# Patient Record
Sex: FEMALE | Race: WHITE
[De-identification: names, ages, dates, MRNs, and addresses within clinical notes are randomized per-mention and may not be internally consistent; named-entity substitution may affect disease eponyms.]

---

## 2017-06-29 ENCOUNTER — HOSPITAL ENCOUNTER (INPATIENT)
Dept: HOSPITAL 17 - PHED | Age: 82
LOS: 8 days | Discharge: NURSING, REHAB OR LONG-TERM CARE | DRG: 481 | End: 2017-07-07
Attending: FAMILY MEDICINE | Admitting: FAMILY MEDICINE
Payer: MEDICARE

## 2017-06-29 VITALS — HEIGHT: 65 IN | BODY MASS INDEX: 14.88 KG/M2 | WEIGHT: 89.29 LBS

## 2017-06-29 VITALS
SYSTOLIC BLOOD PRESSURE: 117 MMHG | HEART RATE: 91 BPM | DIASTOLIC BLOOD PRESSURE: 61 MMHG | RESPIRATION RATE: 18 BRPM | OXYGEN SATURATION: 98 %

## 2017-06-29 VITALS
OXYGEN SATURATION: 98 % | HEART RATE: 96 BPM | DIASTOLIC BLOOD PRESSURE: 77 MMHG | SYSTOLIC BLOOD PRESSURE: 138 MMHG | RESPIRATION RATE: 16 BRPM | TEMPERATURE: 97.6 F

## 2017-06-29 VITALS
HEART RATE: 97 BPM | RESPIRATION RATE: 18 BRPM | SYSTOLIC BLOOD PRESSURE: 133 MMHG | OXYGEN SATURATION: 100 % | DIASTOLIC BLOOD PRESSURE: 66 MMHG

## 2017-06-29 VITALS
SYSTOLIC BLOOD PRESSURE: 139 MMHG | HEART RATE: 88 BPM | DIASTOLIC BLOOD PRESSURE: 77 MMHG | TEMPERATURE: 98 F | RESPIRATION RATE: 18 BRPM | OXYGEN SATURATION: 96 %

## 2017-06-29 VITALS — HEART RATE: 81 BPM

## 2017-06-29 DIAGNOSIS — Z66: ICD-10-CM

## 2017-06-29 DIAGNOSIS — I25.10: ICD-10-CM

## 2017-06-29 DIAGNOSIS — F02.80: ICD-10-CM

## 2017-06-29 DIAGNOSIS — I47.1: ICD-10-CM

## 2017-06-29 DIAGNOSIS — I10: ICD-10-CM

## 2017-06-29 DIAGNOSIS — N28.9: ICD-10-CM

## 2017-06-29 DIAGNOSIS — M81.0: ICD-10-CM

## 2017-06-29 DIAGNOSIS — I49.3: ICD-10-CM

## 2017-06-29 DIAGNOSIS — S72.21XA: ICD-10-CM

## 2017-06-29 DIAGNOSIS — D50.0: ICD-10-CM
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
## 2017-06-29 DIAGNOSIS — N39.0: ICD-10-CM

## 2017-06-29 DIAGNOSIS — E86.0: ICD-10-CM
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
## 2017-06-29 DIAGNOSIS — E78.5: ICD-10-CM

## 2017-06-29 DIAGNOSIS — S72.141A: Primary | ICD-10-CM

## 2017-06-29 DIAGNOSIS — F41.9: ICD-10-CM

## 2017-06-29 DIAGNOSIS — G30.9: ICD-10-CM

## 2017-06-29 LAB
ALP SERPL-CCNC: 105 U/L (ref 45–117)
ALT SERPL-CCNC: 17 U/L (ref 10–53)
ANION GAP SERPL CALC-SCNC: 15 MEQ/L (ref 5–15)
APTT BLD: 23.8 SEC (ref 24.3–30.1)
AST SERPL-CCNC: 38 U/L (ref 15–37)
BACTERIA #/AREA URNS HPF: (no result) /HPF
BASOPHILS # BLD AUTO: 0 TH/MM3 (ref 0–0.2)
BASOPHILS NFR BLD: 0.1 % (ref 0–2)
BILIRUB SERPL-MCNC: 0.7 MG/DL (ref 0.2–1)
BUN SERPL-MCNC: 36 MG/DL (ref 7–18)
CHLORIDE SERPL-SCNC: 104 MEQ/L (ref 98–107)
CK MB SERPL-MCNC: 6.1 NG/ML (ref 0.5–3.6)
CK SERPL-CCNC: 1373 U/L (ref 26–192)
COLOR UR: YELLOW
COMMENT (UR): (no result)
CULTURE IF INDICATED: (no result)
EOSINOPHIL # BLD: 0 TH/MM3 (ref 0–0.4)
EOSINOPHIL NFR BLD: 0.1 % (ref 0–4)
ERYTHROCYTE [DISTWIDTH] IN BLOOD BY AUTOMATED COUNT: 13.6 % (ref 11.6–17.2)
GFR SERPLBLD BASED ON 1.73 SQ M-ARVRAT: 36 ML/MIN (ref 89–?)
GLUCOSE UR STRIP-MCNC: (no result) MG/DL
HCO3 BLD-SCNC: 23.3 MEQ/L (ref 21–32)
HCT VFR BLD CALC: 34 % (ref 35–46)
HEMO FLAGS: (no result)
HGB UR QL STRIP: (no result)
INR PPP: 1 RATIO
KETONES UR STRIP-MCNC: (no result) MG/DL
LEUKOCYTE ESTERASE UR QL STRIP: (no result) /HPF (ref 0–5)
LYMPHOCYTES # BLD AUTO: 0.7 TH/MM3 (ref 1–4.8)
LYMPHOCYTES NFR BLD AUTO: 4.3 % (ref 9–44)
MCH RBC QN AUTO: 28.7 PG (ref 27–34)
MCHC RBC AUTO-ENTMCNC: 32.8 % (ref 32–36)
MCV RBC AUTO: 87.5 FL (ref 80–100)
METHOD OF COLLECTION: (no result)
MONOCYTES NFR BLD: 7.6 % (ref 0–8)
NEUTROPHILS # BLD AUTO: 13.8 TH/MM3 (ref 1.8–7.7)
NEUTROPHILS NFR BLD AUTO: 87.9 % (ref 16–70)
NITRITE UR QL STRIP: (no result)
PLATELET # BLD: 296 TH/MM3 (ref 150–450)
POTASSIUM SERPL-SCNC: 4.3 MEQ/L (ref 3.5–5.1)
PROTHROMBIN TIME: 10.7 SEC (ref 9.8–11.6)
RBC # BLD AUTO: 3.89 MIL/MM3 (ref 4–5.3)
RBC #/AREA URNS HPF: (no result) /HPF (ref 0–3)
SODIUM SERPL-SCNC: 142 MEQ/L (ref 136–145)
SP GR UR STRIP: 1.02 (ref 1–1.03)
SQUAMOUS #/AREA URNS HPF: (no result) /HPF (ref 0–5)
WBC # BLD AUTO: 15.7 TH/MM3 (ref 4–11)

## 2017-06-29 PROCEDURE — C1713 ANCHOR/SCREW BN/BN,TIS/BN: HCPCS

## 2017-06-29 PROCEDURE — 36430 TRANSFUSION BLD/BLD COMPNT: CPT

## 2017-06-29 PROCEDURE — 87186 SC STD MICRODIL/AGAR DIL: CPT

## 2017-06-29 PROCEDURE — 86901 BLOOD TYPING SEROLOGIC RH(D): CPT

## 2017-06-29 PROCEDURE — 51702 INSERT TEMP BLADDER CATH: CPT

## 2017-06-29 PROCEDURE — 83735 ASSAY OF MAGNESIUM: CPT

## 2017-06-29 PROCEDURE — 93005 ELECTROCARDIOGRAM TRACING: CPT

## 2017-06-29 PROCEDURE — 87077 CULTURE AEROBIC IDENTIFY: CPT

## 2017-06-29 PROCEDURE — 81001 URINALYSIS AUTO W/SCOPE: CPT

## 2017-06-29 PROCEDURE — 86900 BLOOD TYPING SEROLOGIC ABO: CPT

## 2017-06-29 PROCEDURE — 82306 VITAMIN D 25 HYDROXY: CPT

## 2017-06-29 PROCEDURE — 87086 URINE CULTURE/COLONY COUNT: CPT

## 2017-06-29 PROCEDURE — 82550 ASSAY OF CK (CPK): CPT

## 2017-06-29 PROCEDURE — 85025 COMPLETE CBC W/AUTO DIFF WBC: CPT

## 2017-06-29 PROCEDURE — 80053 COMPREHEN METABOLIC PANEL: CPT

## 2017-06-29 PROCEDURE — 84132 ASSAY OF SERUM POTASSIUM: CPT

## 2017-06-29 PROCEDURE — 82948 REAGENT STRIP/BLOOD GLUCOSE: CPT

## 2017-06-29 PROCEDURE — P9016 RBC LEUKOCYTES REDUCED: HCPCS

## 2017-06-29 PROCEDURE — 72125 CT NECK SPINE W/O DYE: CPT

## 2017-06-29 PROCEDURE — 96374 THER/PROPH/DIAG INJ IV PUSH: CPT

## 2017-06-29 PROCEDURE — 86920 COMPATIBILITY TEST SPIN: CPT

## 2017-06-29 PROCEDURE — 73552 X-RAY EXAM OF FEMUR 2/>: CPT

## 2017-06-29 PROCEDURE — 85610 PROTHROMBIN TIME: CPT

## 2017-06-29 PROCEDURE — 73502 X-RAY EXAM HIP UNI 2-3 VIEWS: CPT

## 2017-06-29 PROCEDURE — 80048 BASIC METABOLIC PNL TOTAL CA: CPT

## 2017-06-29 PROCEDURE — 76000 FLUOROSCOPY <1 HR PHYS/QHP: CPT

## 2017-06-29 PROCEDURE — 71010: CPT

## 2017-06-29 PROCEDURE — 70450 CT HEAD/BRAIN W/O DYE: CPT

## 2017-06-29 PROCEDURE — 85730 THROMBOPLASTIN TIME PARTIAL: CPT

## 2017-06-29 PROCEDURE — 86850 RBC ANTIBODY SCREEN: CPT

## 2017-06-29 PROCEDURE — 82552 ASSAY OF CPK IN BLOOD: CPT

## 2017-06-29 RX ADMIN — PHENYTOIN SODIUM SCH MLS/HR: 50 INJECTION INTRAMUSCULAR; INTRAVENOUS at 18:04

## 2017-06-29 RX ADMIN — Medication SCH ML: at 20:27

## 2017-06-29 RX ADMIN — SODIUM CHLORIDE SCH MLS/HR: 900 INJECTION INTRAVENOUS at 18:05

## 2017-06-29 RX ADMIN — DOCUSATE SODIUM SCH MG: 100 CAPSULE, LIQUID FILLED ORAL at 20:27

## 2017-06-29 NOTE — RADRPT
EXAM DATE/TIME:  06/29/2017 15:23 

 

[facility] COMPARISON:     No previous studies available for comparison.

 

INDICATIONS :     Patient found on floor today for unknown period of time

                      

RADIATION DOSE:     56.15 CTDIvol (mGy) 

 

 

MEDICAL HISTORY :     Non-responsive.  

SURGICAL HISTORY :      Non-responsive. 

ENCOUNTER:      Initial

ACUITY:      1 day

PAIN SCALE:      Non-responsive

LOCATION:        cranial 

 

TECHNIQUE:     Multiple contiguous axial images were obtained of the head.  Using automated exposure 
control and adjustment of the mA and/or kV according to patient size, radiation dose was kept as low 
as reasonably achievable to obtain optimal diagnostic quality images.   DICOM format image data is av
ailable electronically for review and comparison.  

 

FINDINGS:     

CEREBRUM:     Severe diffuse cerebral atrophy is noted. Moderate periventricular and subcortical whit
e matter small vessel ischemic changes are noted bilaterally. No evidence of midline shift, mass lesi
on, hemorrhage or acute infarction.  No extra-axial fluid collections are seen.

POSTERIOR FOSSA:     The cerebellum and brainstem are intact.  The 4th ventricle is midline.  The cer
ebellopontine angle is unremarkable.

EXTRACRANIAL:     The visualized portion of the orbits is intact.

SKULL:     The calvaria is intact.  No evidence of skull fracture.

 

CONCLUSION:     

1. Severe diffuse cerebral atrophy. 

2. Moderate periventricular and subcortical white matter small vessels changes bilaterally. 

3. No acute infarct, acute hemorrhage, mass effect or extra-axial fluid collections. 

 

 Tomas Bang MD on June 29, 2017 at 15:47           

Board Certified Radiologist.

 This report was verified electronically.

## 2017-06-29 NOTE — RADRPT
EXAM DATE/TIME:  06/29/2017 15:23 

 

[facility] COMPARISON:     

CHEST SINGLE AP, August 24, 2014, 0:09.

 

                     

INDICATIONS :     

Patient wouldn't put weight on leg while being assisted to stand caregiver states. Best images possib
le due to patient condition and dementia.

                     

 

MEDICAL HISTORY :     

Dementia.   

 

SURGICAL HISTORY :     

None.   

 

ENCOUNTER:     

Initial                                        

 

ACUITY:     

1 day      

 

PAIN SCORE:     

Non-responsive.

 

LOCATION:     

Bilateral chest 

 

FINDINGS:     

The heart and mediastinal structures are normal.  The pulmonary vascular pattern is normal.  The lung
s are clear.  Degenerative changes and scoliosis of the thoracolumbar spine are noted. 

 

CONCLUSION:     

 

1.  No acute cardiopulmonary disease.  

2.  Degenerative changes and scoliosis of the thoracolumbar spine.  

 

 Tomas Bang MD on June 29, 2017 at 15:43                

Board Certified Radiologist.

 This report was verified electronically.

## 2017-06-29 NOTE — PD
HPI


Chief Complaint:  hip pain


Time Seen by Provider:  14:16


Travel History


International Travel<30 days:  No


Contact w/Intl Traveler<30days:  No





History of Present Illness


HPI


This is an 84-year-old female who presents to the emergency department with 

pain in her right leg.  Her  reports that last evening when he tried to 

get her to stand up to go to dinner she wouldn't stand on her right leg and he 

noticed that it was somewhat swollen.  He tried several times to get her up he 

was unable to.  He has a nurse that helps and take care of the patient and 

neither him nor the nurse thinks that she fell but they can't be sure.  

Normally the patient ambulates around the house independently.  He says he just 

had a palliative care nurse come to the house yesterday to evaluate the patient 

for hospice but she said she didn't meet criteria because she is so functional.

  Today the pain is been worsening and the patient continues not to walk so 

they brought her to the emergency department.





PFSH


Past Medical History


Alzheimer's Disease:  Yes


Anxiety:  Yes


Heart Rhythm Problems:  Yes (SVT)


Cardiovascular Problems:  Yes (MVR)


High Cholesterol:  Yes


Congestive Heart Failure:  No


Dementia:  Yes


Diminished Hearing:  No


Endocrine:  No


Gastrointestinal Disorders:  No


Genitourinary:  No


Hypertension:  Yes


Implanted Vascular Access Dvce:  No


Musculoskeletal:  No


Neurologic:  Yes


Reproductive:  No


Respiratory:  No


Immunizations Current:  Yes


PNEUMOCCOCAL Vaccine (Year):  1


Menopausal:  Yes





Past Surgical History


Appendectomy:  Yes


Other Surgery:  No





Social History


Alcohol Use:  No


Tobacco Use:  No


Substance Use:  No





Allergies-Medications


(Allergen,Severity, Reaction):  


Coded Allergies:  


     Epinephrine (Verified  Adverse Reaction, Severe, Palpitations , 17)


     Lidocaine (Verified  Adverse Reaction, Severe, Palpitations , 17)


Reported Meds & Prescriptions





Reported Meds & Active Scripts


Active


Reported


Quetiapine (Quetiapine Fumarate) 50 Mg Tab 50 Mg PO HS








Review of Systems


ROS Limitations:  Poor Historian





Physical Exam


Narrative


GENERAL: Frail elderly female, uncomfortable appearing


SKIN: Dry with skin tenting


HEAD: Atraumatic. Normocephalic. 


EYES: Pupils equal and round.  No injection or drainage. 


ENT:  Moist mucous membranes


NECK: Trachea midline. 


CARDIOVASCULAR: Regular rate and rhythm.  No murmur appreciated.  Both feet are 

warm with normal capillary refill.  We were able to Doppler DP.  Pulses in both 

feet


RESPIRATORY: Clear to auscultation. Breath sounds equal bilaterally. 


GASTROINTESTINAL: Abdomen soft, non-tender, nondistended. 


MUSCULOSKELETAL: Swelling of the right proximal lower extremity with soft 

compartments.


NEUROLOGICAL: Mumbling words. No obvious cranial nerve deficits.  Moving all 

extremities.





Data


Data


Last Documented VS





Vital Signs








  Date Time  Temp Pulse Resp B/P Pulse Ox O2 Delivery O2 Flow Rate FiO2


 


17 15:45  91 18 117/61 98 Room Air  


 


17 14:20 97.6       








Orders





 Complete Blood Count With Diff (17 14:19)


Comprehensive Metabolic Panel (17 14:19)


Ct Brain W/O Iv Contrast(Rout) (17 )


Ct Cerv Spine W/O Contrast (17 )


Creatine Kinase (Cpk) (17 14:19)


Urinalysis - C+S If Indicated (17 14:19)


Hip, Uni(Ap&Lat) W Ap Pelvis (17 )


Femur (Ap & Lat/2vws) (17 )


Ketamine Inj (Ketalar Inj) (17 14:45)


CKMB (17 14:35)


CKMB% (17 14:35)


Chest, Single Ap (17 )


Urinary Catheter Insert/Apply (17 15:55)


Notify Dr: Other (17 15:55)


Sodium Chlor 0.9% 1000 Ml Inj (Ns 1000 M (17 16:00)


Type And Screen (17 15:55)


Prothrombin Time / Inr (Pt) (17 15:55)


Act Partial Throm Time (Ptt) (17 15:55)


Electrocardiogram (17 )


Admit Order (Ed Use Only) (17 15:58)





Labs








 Laboratory Tests








Test 17





 14:35


 


White Blood Count 15.7 TH/MM3


 


Red Blood Count 3.89 MIL/MM3


 


Hemoglobin 11.2 GM/DL


 


Hematocrit 34.0 %


 


Mean Corpuscular Volume 87.5 FL


 


Mean Corpuscular Hemoglobin 28.7 PG


 


Mean Corpuscular Hemoglobin 32.8 %





Concent 


 


Red Cell Distribution Width 13.6 %


 


Platelet Count 296 TH/MM3


 


Mean Platelet Volume 10.4 FL


 


Neutrophils (%) (Auto) 87.9 %


 


Lymphocytes (%) (Auto) 4.3 %


 


Monocytes (%) (Auto) 7.6 %


 


Eosinophils (%) (Auto) 0.1 %


 


Basophils (%) (Auto) 0.1 %


 


Neutrophils # (Auto) 13.8 TH/MM3


 


Lymphocytes # (Auto) 0.7 TH/MM3


 


Monocytes # (Auto) 1.2 TH/MM3


 


Eosinophils # (Auto) 0.0 TH/MM3


 


Basophils # (Auto) 0.0 TH/MM3


 


CBC Comment DIFF FINAL 


 


Differential Comment  


 


Sodium Level 142 MEQ/L


 


Potassium Level 4.3 MEQ/L


 


Chloride Level 104 MEQ/L


 


Carbon Dioxide Level 23.3 MEQ/L


 


Anion Gap 15 MEQ/L


 


Blood Urea Nitrogen 36 MG/DL


 


Creatinine 1.40 MG/DL


 


Estimat Glomerular Filtration 36 ML/MIN





Rate 


 


Random Glucose 222 MG/DL


 


Calcium Level 9.0 MG/DL


 


Total Bilirubin 0.7 MG/DL


 


Aspartate Amino Transf 38 U/L





(AST/SGOT) 


 


Alanine Aminotransferase 17 U/L





(ALT/SGPT) 


 


Alkaline Phosphatase 105 U/L


 


Total Creatine Kinase 1373 U/L


 


Creatine Kinase MB 6.1 NG/ML


 


Creatine Kinase MB % 0.4 %


 


Total Protein 7.0 GM/DL


 


Albumin 3.2 GM/DL














MDM


Medical Decision Making


Medical Screen Exam Complete:  Yes


Emergency Medical Condition:  Yes


Interpretation(s)


Afebrile, mild tachycardia


Leukocytosis


Mild anemia


87% neutrophils


Renal insufficiency


CK is 1373





Last 24 hours Impressions








Head CT 17 0000 Signed





Impressions: 





 Service Date/Time:   15:23 - CONCLUSION:  1. Severe 





 diffuse cerebral atrophy.  2. Moderate periventricular and subcortical white 





 matter small vessels changes bilaterally.  3. No acute infarct, acute 





 hemorrhage, mass effect or extra-axial fluid collections.    Tomas Bang MD 


 


Femur X-Ray 17 0000 Signed





Impressions: 





 Service Date/Time:   15:04 - CONCLUSION:  Proximal 

right 





 femur fracture     Anatoliy Reyna MD 


 


Chest X-Ray 17 0000 Signed





Impressions: 





 Service Date/Time:   15:23 - CONCLUSION:   1.  No acute 





 cardiopulmonary disease.   2.  Degenerative changes and scoliosis of the 





 thoracolumbar spine.     Tomas Bang MD 








Differential Diagnosis


Femur fracture, hip dislocation, intracranial hemorrhage, cervical spine 

fracture, electrolyte abnormality, dehydration


Narrative Course


This is a very frail 84-year-old female with dementia who presents to the 

emergency department with a proximal femur fracture.  It's unclear what the 

mechanism of injury was an it's possible that she fell and no one witnessed.  

She was placed on a monitor and an IV was established.  Labs demonstrate an 

elevated CK and symmetric acute renal insufficiency consistent with 

dehydration.  She was given a liter of IV fluid.  She does have a leukocytosis 

which may be a stress response.  Urinalysis is pending and nurse was asked to 

call results to inpatient hospitalist.  I spoke to Dr. Tran's team regarding 

the patient's femur fracture and the patient should be nothing by mouth after 

midnight.





I did have a conversation with the patient's  who is her decision maker 

regarding goals of care.  He says the patient has a living will and she would 

not want to be resuscitated if she  naturally in the hospital.  He agreed 

to placing a DNR/DNI order in the chart.





Physician Communication


Physician Communication


Discussed with Dr. Tran's team and Dr. Santacruz's service.





Diagnosis





 Primary Impression:  


 Fracture, proximal femur


 Qualified Code:  S72.001A - Fracture, proximal femur, right, closed, initial 

encounter





Admitting Information


Admitting Physician Requests:  it








Lyndsey Walker MD 2017 14:26

## 2017-06-29 NOTE — RADRPT
EXAM DATE/TIME:  06/29/2017 15:04 

 

[facility] COMPARISON:     

No previous studies available for comparison.

 

                     

INDICATIONS :     

Patient wouldn't put weight on leg while being assisted to stand caregiver states.Best images possibl
e due to patient condition and dementia.

                     

 

MEDICAL HISTORY :            

Dementia.   

 

SURGICAL HISTORY :     

None.   

 

ENCOUNTER:     

Initial                                        

 

ACUITY:     

1 day      

 

PAIN SCORE:     

Non-responsive.

 

LOCATION:     

Right  Femur.

 

FINDINGS:     

Examination reveals a mildly displaced and angulated spiral fracture of the intertrochanteric and sub
trochanteric portion of the proximal right femur. The femoral head is intact and remains situated ove
r the acetabulum. The distal femur appears intact.

 

CONCLUSION:     

Proximal right femur fracture

 

 

 

 Anatoliy Reyna MD on June 29, 2017 at 15:49           

Board Certified Radiologist.

 This report was verified electronically.

## 2017-06-29 NOTE — RADRPT
EXAM DATE/TIME:  06/29/2017 15:23 

 

[facility] COMPARISON:     

No previous studies available for comparison.

 

 

INDICATIONS :     

Patient found on floor today.

                      

 

RADIATION DOSE:     

25.49 CTDIvol (mGy) 

 

 

 

MEDICAL HISTORY :      

Non-responsive.  

 

SURGICAL HISTORY :       

Non-responsive. 

 

ENCOUNTER:      

Initial

 

ACUITY:      

1 day

 

PAIN SCALE:      

Non-responsive

 

LOCATION:       

Cranial 

 

TECHNIQUE:     

Volumetric scanning of the cervical spine was performed. Multiplanar reconstructions in the sagittal,
 coronal and oblique axial planes were performed.   Using automated exposure control and adjustment o
f the mA and/or kV according to patient size, radiation dose was kept as low as reasonably achievable
 to obtain optimal diagnostic quality images.   DICOM format image data is available electronically f
or review and comparison.  

 

FINDINGS:     

 

There is no acute fracture or prevertebral soft tissue swelling.  Diffuse cervical spondylosis is not
ed at all levels.  Grade I retrolisthesis of C4 in relation to C3 and C5 is noted.  Grade I anterolis
thesis of C7 in relation to T1 is noted.  Moderate spinal stenosis and bilateral foraminal narrowing 
is noted at C4-5.  Moderate bilateral foraminal narrowing without spinal stenosis at C3-4, C5-6 and C
6-7.  The bony relationship and alignment between C1 and C2 is well maintained.  Scoliosis of the cer
vical spine is noted. 

 

CONCLUSION:

1.  No acute fracture or prevertebral soft tissue swelling.

2.  Moderate spinal stenosis and bilateral foraminal narrowing at C4-5. 

3.  Moderate bilateral foraminal narrowing but no spinal stenosis at C3-4, C5-6 and C6-7. 

4.  Grade I retrolisthesis of C4 in relation to C3 and C5. 

5.  Grade I anterolisthesis of C7 in relation to T1. 

6.  Diffuse cervical spondylosis and scoliosis of the cervical spine. 

 

 Tomas Bang MD on June 29, 2017 at 15:59                

Board Certified Radiologist.

 This report was verified electronically.

## 2017-06-29 NOTE — RADRPT
EXAM DATE/TIME:  06/29/2017 15:09 

 

[facility] COMPARISON:     

No previous studies available for comparison.

 

                     

INDICATIONS :     

Patient wouldn't put weight on leg while being assisted to stand caregiver states. Best images possib
le due to patient condition and dementia.

                     

 

MEDICAL HISTORY :            

Dementia.   

 

SURGICAL HISTORY :     

None.   

 

ENCOUNTER:     

Initial                                        

 

ACUITY:     

1 day      

 

PAIN SCORE:     

Non-responsive.

 

LOCATION:     

Left  Hip.

 

FINDINGS:     

AP view of the pelvis demonstrates a 4 fragment comminuted intertrochanteric fracture of the right hi
p. Femoral head remains well-seated within the acetabulum.

 

Targeted evaluation of the left hip is unremarkable without evidence of fracture or dislocation.

 

The bony pelvis is intact.

 

CONCLUSION:     

Comminuted 4 fragment intertrochanteric fracture of the right hip

 

Intact left hip and AP pelvis.

 

 

 

 Fletcher Medina MD on June 29, 2017 at 16:26           

Board Certified Radiologist.

 This report was verified electronically.

## 2017-06-30 VITALS — OXYGEN SATURATION: 94 %

## 2017-06-30 VITALS
SYSTOLIC BLOOD PRESSURE: 130 MMHG | TEMPERATURE: 98 F | OXYGEN SATURATION: 96 % | RESPIRATION RATE: 22 BRPM | DIASTOLIC BLOOD PRESSURE: 87 MMHG | HEART RATE: 185 BPM

## 2017-06-30 VITALS
SYSTOLIC BLOOD PRESSURE: 115 MMHG | DIASTOLIC BLOOD PRESSURE: 57 MMHG | OXYGEN SATURATION: 95 % | RESPIRATION RATE: 18 BRPM | HEART RATE: 72 BPM | TEMPERATURE: 96.7 F

## 2017-06-30 VITALS
OXYGEN SATURATION: 100 % | SYSTOLIC BLOOD PRESSURE: 128 MMHG | HEART RATE: 62 BPM | TEMPERATURE: 97.2 F | RESPIRATION RATE: 16 BRPM | DIASTOLIC BLOOD PRESSURE: 69 MMHG

## 2017-06-30 VITALS
TEMPERATURE: 97.6 F | HEART RATE: 82 BPM | SYSTOLIC BLOOD PRESSURE: 105 MMHG | DIASTOLIC BLOOD PRESSURE: 60 MMHG | RESPIRATION RATE: 18 BRPM | OXYGEN SATURATION: 93 %

## 2017-06-30 VITALS
DIASTOLIC BLOOD PRESSURE: 50 MMHG | SYSTOLIC BLOOD PRESSURE: 103 MMHG | RESPIRATION RATE: 16 BRPM | OXYGEN SATURATION: 95 % | HEART RATE: 80 BPM | TEMPERATURE: 97.3 F

## 2017-06-30 VITALS
SYSTOLIC BLOOD PRESSURE: 100 MMHG | HEART RATE: 78 BPM | TEMPERATURE: 96.9 F | RESPIRATION RATE: 16 BRPM | OXYGEN SATURATION: 94 % | DIASTOLIC BLOOD PRESSURE: 61 MMHG

## 2017-06-30 VITALS — HEART RATE: 90 BPM

## 2017-06-30 VITALS — HEART RATE: 87 BPM

## 2017-06-30 LAB
ANION GAP SERPL CALC-SCNC: 7 MEQ/L (ref 5–15)
BUN SERPL-MCNC: 23 MG/DL (ref 7–18)
CHLORIDE SERPL-SCNC: 110 MEQ/L (ref 98–107)
CK SERPL-CCNC: 539 U/L (ref 26–192)
GFR SERPLBLD BASED ON 1.73 SQ M-ARVRAT: 93 ML/MIN (ref 89–?)
HCO3 BLD-SCNC: 26.4 MEQ/L (ref 21–32)
POTASSIUM SERPL-SCNC: 3.9 MEQ/L (ref 3.5–5.1)
SODIUM SERPL-SCNC: 143 MEQ/L (ref 136–145)

## 2017-06-30 PROCEDURE — 0QS606Z REPOSITION RIGHT UPPER FEMUR WITH INTRAMEDULLARY INTERNAL FIXATION DEVICE, OPEN APPROACH: ICD-10-PCS | Performed by: ORTHOPAEDIC SURGERY

## 2017-06-30 RX ADMIN — FAMOTIDINE SCH MG: 10 INJECTION, SOLUTION INTRAVENOUS at 14:29

## 2017-06-30 RX ADMIN — MORPHINE SULFATE PRN MG: 2 INJECTION, SOLUTION INTRAMUSCULAR; INTRAVENOUS at 20:37

## 2017-06-30 RX ADMIN — Medication SCH ML: at 09:00

## 2017-06-30 RX ADMIN — SODIUM CHLORIDE SCH MLS/HR: 900 INJECTION INTRAVENOUS at 18:06

## 2017-06-30 RX ADMIN — DOCUSATE SODIUM SCH MG: 100 CAPSULE, LIQUID FILLED ORAL at 12:48

## 2017-06-30 RX ADMIN — DOCUSATE SODIUM SCH MG: 100 CAPSULE, LIQUID FILLED ORAL at 09:00

## 2017-06-30 RX ADMIN — CALCIUM CARBONATE-CHOLECALCIFEROL TAB 250 MG-125 UNIT SCH MG: 250-125 TAB at 18:06

## 2017-06-30 RX ADMIN — INSULIN ASPART SCH: 100 INJECTION, SOLUTION INTRAVENOUS; SUBCUTANEOUS at 16:00

## 2017-06-30 RX ADMIN — PHENYTOIN SODIUM SCH MLS/HR: 50 INJECTION INTRAMUSCULAR; INTRAVENOUS at 12:48

## 2017-06-30 RX ADMIN — CALCIUM CARBONATE-CHOLECALCIFEROL TAB 250 MG-125 UNIT SCH MG: 250-125 TAB at 12:55

## 2017-06-30 RX ADMIN — INSULIN ASPART SCH: 100 INJECTION, SOLUTION INTRAVENOUS; SUBCUTANEOUS at 20:40

## 2017-06-30 RX ADMIN — CALCIUM CARBONATE-CHOLECALCIFEROL TAB 250 MG-125 UNIT SCH MG: 250-125 TAB at 18:00

## 2017-06-30 RX ADMIN — ERGOCALCIFEROL ONE UNITS: 1.25 CAPSULE, LIQUID FILLED ORAL at 12:48

## 2017-06-30 RX ADMIN — HYDROCODONE BITARTRATE AND ACETAMINOPHEN PRN TAB: 5; 325 TABLET ORAL at 01:33

## 2017-06-30 RX ADMIN — FAMOTIDINE SCH MG: 10 INJECTION, SOLUTION INTRAVENOUS at 20:46

## 2017-06-30 RX ADMIN — SODIUM CHLORIDE, PRESERVATIVE FREE SCH ML: 5 INJECTION INTRAVENOUS at 20:38

## 2017-06-30 RX ADMIN — PHENYTOIN SODIUM SCH MLS/HR: 50 INJECTION INTRAMUSCULAR; INTRAVENOUS at 16:18

## 2017-06-30 RX ADMIN — CALCIUM CARBONATE-CHOLECALCIFEROL TAB 250 MG-125 UNIT SCH MG: 250-125 TAB at 12:48

## 2017-06-30 RX ADMIN — DOCUSATE SODIUM SCH MG: 100 CAPSULE, LIQUID FILLED ORAL at 20:38

## 2017-06-30 RX ADMIN — ERGOCALCIFEROL ONE UNITS: 1.25 CAPSULE, LIQUID FILLED ORAL at 10:00

## 2017-06-30 NOTE — HHI.PR
Subjective


Remarks


pt seen post op  vss afebrile   ARNP came by to see her earlier this am  but 

she was already in surgery





Objective





 Vital Signs








  Date Time  Temp Pulse Resp B/P Pulse Ox O2 Delivery O2 Flow Rate FiO2


 


6/30/17 09:53 97.6 91 13 164/63 100 Simple Mask 7 


 


6/30/17 04:18 96.7 72 18 115/57 95   


 


6/30/17 01:03  87      


 


6/30/17 00:42 97.6 82 18 105/60 93   


 


6/29/17 22:17  81      


 


6/29/17 21:10 98.0 88 18 139/77 96   


 


6/29/17 18:00  97 18 133/66 100 Room Air  


 


6/29/17 15:45  91 18 117/61 98 Room Air  


 


6/29/17 14:20 97.6 96 16 138/77 98   








 I/O








 6/29/17 6/29/17 6/29/17 6/30/17 6/30/17 6/30/17





 07:00 15:00 23:00 07:00 15:00 23:00


 


Intake Total   1220 ml 840 ml 1000 ml 


 


Output Total   250 ml 150 ml 150 ml 


 


Balance   970 ml 690 ml 850 ml 


 


      


 


Intake Oral   120 ml 0 ml  


 


IV Total   1100 ml 840 ml  


 


Other     1000 ml 


 


Output Urine Total   250 ml 150 ml 100 ml 


 


Estimated Blood Loss     50 ml 


 


# Bowel Movements   0 0  








Result Diagram:  


6/29/17 1435                                                                   

             6/29/17 1435





Procedures


orif hip


Objective Remarks


GENERAL: 


SKIN: Warm and dry.


HEAD: Atraumatic. Normocephalic. 


EYES: Pupils equal and round. No scleral icterus. No injection or drainage. 


ENT: No nasal bleeding or discharge.  Mucous membranes pink and moist.


NECK: Trachea midline. No JVD. 


CARDIOVASCULAR: Regular rate and rhythm.  


RESPIRATORY: No accessory muscle use. Clear to auscultation. Breath sounds 

equal bilaterally. 


GASTROINTESTINAL: Abdomen soft, non-tender, nondistended. Hepatic and splenic 

margins not palpable. 


MUSCULOSKELETAL: Extremities without clubbing, cyanosis, or edema. No obvious 

deformities. 


NEUROLOGICAL: asleep post op No obvious cranial nerve deficits.  Motor grossly 

within normal limits.   end stage alzheimers


PSYCHIATRIC: Appropriate mood and affect; insight and judgment normal.


Medications and IVs





 Inpatient Medications


Acetaminophen (Tylenol) 650 mg Q4H  PRN PO FEVER;  Start 6/29/17 at 16:15


Acetaminophen/ Hydrocodone Bitart (Norco  5-325 Mg) 1 tab Q4H  PRN PO PAIN 1-10 

Last administered on 6/30/17at 01:33;  Start 6/30/17 at 00:15


Calcium/Vitamin D (Oscal-D 250-125) 250 mg TID PO ;  Start 6/30/17 at 13:00


Cefazolin Sodium/ Sodium Chloride (Ancef Inj/NS Inj) 100 ml @  200 mls/hr Q8H 

IV ;  Start 6/30/17 at 09:45;  Stop 6/30/17 at 10:18;  Status DC


Ceftriaxone Sodium 1000 mg/ Sodium Chloride 100 ml @  200 mls/hr Q24H IV  Last 

administered on 6/29/17at 18:05;  Start 6/29/17 at 18:00


Chlorhexidine Gluconate (Chlorhexidine 2% Cloth) 3 pack ON CALL  PRN TOPICAL 

SEE LABEL COMMENTS;  Start 6/29/17 at 22:00;  Stop 7/2/17 at 21:59


Cholecalciferol (Vitamin D3) 5,000 units DAILY PO ;  Start 7/1/17 at 09:00


Docusate Sodium (Colace) 100 mg BID PO ;  Start 6/29/17 at 21:00


Enoxaparin Sodium 30 mg 30 mg Q24H SQ ;  Start 7/1/17 at 09:00


Ergocalciferol (Drisdol) 50,000 units ONCE  ONCE PO ;  Start 6/30/17 at 10:00;  

Stop 6/30/17 at 10:01;  Status DC


Insulin Human Regular (NovoLIN R INJ) See Protocol Table ... ON CALL  PRN SQ 

SEE PROTOCOL TABLE;  Start 6/29/17 at 22:00;  Stop 7/2/17 at 21:59


IV Flush (NS Flush) 2 ml BID IVF ;  Start 6/30/17 at 21:00


Ketamine HCl 5 mg 5 mg ONCE  ONCE IV PUSH  Last administered on 6/29/17at 14:43

;  Start 6/29/17 at 14:45;  Stop 6/29/17 at 14:46;  Status DC


Lactated Ringer's 1,000 ml @  30 mls/hr Q24H PRN IV SEE LABEL COMMENTS;  Start 6 /29/17 at 22:00;  Stop 7/2/17 at 21:59


Metoprolol Tartrate (Lopressor) 25 mg ON CALL  PRN PO SEE LABEL COMMENTS;  

Start 6/29/17 at 22:00;  Stop 7/2/17 at 21:59


Miscellaneous Information ALL NURSING DEPARTME... UNSCH  PRN .XX SEE LABEL 

COMMENTS;  Start 6/30/17 at 09:46;  Stop 7/1/17 at 09:45


Morphine Sulfate (Morphine Inj) 3 mg Q3H  PRN IV PUSH break thru pain;  Start 6/ 30/17 at 09:45


Naloxone HCl 0.4 mg 0.4 mg UNSCH  PRN IV RESPIRATORY RATE LESS THAN 10;  Start 6 /29/17 at 16:15


Ondansetron HCl (Zofran Inj) 4 mg Q6H  PRN IVP NAUSEA OR VOMITING;  Start 6/29/ 17 at 16:15


Pneumococcal Polyvalent Vaccine (Pneumovax-23 Inj) 25 mcg ONCE ONCE IM ;  Start 

6/30/17 at 10:00;  Stop 6/30/17 at 10:00;  Status DC


Povidone Iodine (Betadine 5% Antisepsis Kit) 1 applic ON CALL  PRN EACH NARE 

SEE LABEL COMMENTS;  Start 6/29/17 at 22:00;  Stop 7/2/17 at 21:59


Sodium Chloride (NS 1000 ml Inj) 1,000 ml @  83 mls/hr Q12H3M IV  Last 

administered on 6/29/17at 18:04;  Start 6/29/17 at 16:12


Sodium Chloride ( ml Inj) 500 ml @  30 mls/hr [id number] PRN IV SEE LABEL 

COMMENTS;  Start 6/29/17 at 22:00;  Stop 7/2/17 at 21:59


Sodium Chloride (NS Flush) 2 ml BID IV FLUSH ;  Start 6/29/17 at 21:00;  Stop 6/ 30/17 at 09:57;  Status DC





Assessment and Plan


Problem List:  


(1) Fracture, proximal femur


Status:  Acute


Assessment and Plan


post op   will probably require a sitter once awake


Discussed Condition With


nuraing


Discharge Planning


[facility] rehab





Problem Qualifiers





(1) Fracture, proximal femur:  


Qualified Code:  S72.001A - Fracture, proximal femur, right, closed, initial 

encounter





Tank Santacruz DO Jun 30, 2017 11:54

## 2017-06-30 NOTE — MB
cc:

VAZQUEZALBINO

****

 

 

DATE OF CONSULTATION:  6/30/2017

 

REASON FOR CONSULTATION

Comminuted right proximal femur fracture.

 

HISTORY OF PRESENT ILLNESS

Marilee is an 84-year-old female who was at home.  She had a fall.  Her 

tried to get her up several times.  He was unable to get her to stand.  She was

unable to stand or ambulate.  She does have dementia.  She normally does

ambulate relatively independently.  She had significant right hip pain.  She

presented to the emergency room where x-rays revealed a comminuted displaced

right proximal femur fracture.  She is currently on the orthopedic floor with

her  at bedside.  She is awake but confused.

 

PAST MEDICAL HISTORY

 

ILLNESSES

1. Cardiac arrhythmia.

2. Alzheimer's disease.

3. Anxiety.

 

SURGERIES

Appendectomy.

 

ALLERGIES

1. EPINEPHRINE.

2. LIDOCAINE.

 

MEDICATIONS

Please see EMR for complete list of inpatient medications.

 

SOCIAL HISTORY

The patient does not smoke, drink or use drugs.  She lives at home with her

.

 

FAMILY HISTORY

Noncontributory.

 

REVIEW OF SYSTEMS

The review of systems is limited secondary to dementia.  The patient and her

 deny headache, visual changes, neck pain, chest pain, shortness of

breath, abdominal pain, nausea, vomiting or recent weight loss.  She complains

of right hip pain.  Pain is worse with movement.

 

PHYSICAL EXAMINATION

GENERAL:  The patient is a thin 84-year-old female in no acute distress.  She

is awake but confused.

VITAL SIGNS:  Temperature 96.7, pulse 72, respirations 18, blood pressure

115/57.  O2 sat is 95% on room air.

HEAD:  The patient is normocephalic.  Pupils are equal.

NECK:  Soft, nontender.  Trachea is midline.

ABDOMEN:  Soft, nontender, nondistended.

EXTREMITIES:  Examination of bilateral upper extremities reveals no pain with

shoulder, elbow or wrist motion.  She has good capillary refill in all of her

fingers.  Radial pulses are palpable bilaterally.

Examination of left leg reveals no pain with hip, knee or ankle motion.  Skin

is intact.  Sensation is intact.

Examination of right leg reveals pain with any hip motion.  Her right leg is

shortened compared to her left.  She has no tenderness around her knee, tibia

or ankle.  Dorsalis pedis pulse is palpable.  She has good capillary refill in

her foot.

 

X-RAYS

X-rays of the right femur were reviewed.  X-rays reveal a comminuted

subtrochanteric and intertrochanteric right hip fracture.

 

IMPRESSION

1. Osteoporosis.

2. Dementia.

3. Displaced right proximal femur fractures.

 

PLAN

The treatment options were discussed with the patient and her .  At this

point I would recommend reduction and intramedullary nail fixation of the right

femur.  The risks of surgery include bleeding, infection, injuries to arteries,

nerves and blood vessels, nonunion, malunion, painful hardware, as well as

medical complications including blood clot, stroke, heart attack and death.

All questions were answered.  I will plan on surgery today.

 

A mid-level provider in my office, nurse practitioner or PA, may see this

patient on a follow-up basis and continue to implement the objective of this

plan including: Starting or adjusting medications, injections of muscle,

tendon, bursa or joints, cast application, orthotic or brace application,

physical therapy, further radiographic studies including x-ray, MRI, CT,

ultrasounds or bone scan, vascular studies, neurologic studies, or other

specialist consultations, and proceeding with surgical management as

appropriate.

 

 

 

                              _________________________________

                              MD FRED Andrade/ALEBRTO

D:  6/30/2017/9:42 AM

T:  6/30/2017/9:51 AM

Visit #:  [id number]

Job #:  [id number]

## 2017-06-30 NOTE — RADRPT
EXAM DATE/TIME:  06/30/2017 09:26 

 

[facility] COMPARISON:     

No previous studies available for comparison.

 

                     

INDICATIONS :     

Open reduction internal fixation right hip troch nail.

                     

 

MEDICAL HISTORY :     

None.          

 

SURGICAL HISTORY :     

None.   

 

ENCOUNTER:     

Initial                                        

 

ACUITY:     

1 day      

 

PAIN SCORE:     

Non-responsive.

 

LOCATION:     

Right Hip.

 

FINDINGS:     

Troch nail is seen bridging the fracture of the proximal femur.  Alignment is anatomic.  

 

CONCLUSION:     

Anatomic alignment. 

 

 Bob Lucia MD FACR on June 30, 2017 at 15:19                

Board Certified Radiologist.

 This report was verified electronically.

## 2017-06-30 NOTE — PD.ORT.PN
Subjective


Subjective Remarks


Fall at home. Unable to ambulate. X-rays show right proximal femur fracture





Objective


Vitals





 Vital Signs








  Date Time  Temp Pulse Resp B/P Pulse Ox O2 Delivery O2 Flow Rate FiO2


 


6/30/17 04:18 96.7 72 18 115/57 95   


 


6/30/17 01:03  87      


 


6/30/17 00:42 97.6 82 18 105/60 93   


 


6/29/17 22:17  81      


 


6/29/17 21:10 98.0 88 18 139/77 96   


 


6/29/17 18:00  97 18 133/66 100 Room Air  


 


6/29/17 15:45  91 18 117/61 98 Room Air  


 


6/29/17 14:20 97.6 96 16 138/77 98   








 I/O








 6/29/17 6/29/17 6/29/17 6/30/17 6/30/17 6/30/17





 07:00 15:00 23:00 07:00 15:00 23:00


 


Intake Total   1220 ml 840 ml  


 


Output Total   250 ml   


 


Balance   970 ml 840 ml  


 


      


 


Intake Oral   120 ml   


 


IV Total   1100 ml 840 ml  


 


Output Urine Total   250 ml   


 


# Bowel Movements   0   








Result Diagram:  


6/29/17 1435                                                                   

             6/29/17 1435





Other Results





Laboratory Tests








Test 6/29/17





 15:55


 


Prothrombin Time 10.7 SEC





 (9.8-11.6)


 


Prothromb Time International 1.0 RATIO





Ratio 








Imaging





Last 72 hours Impressions








Hip and Pelvis X-Ray 6/29/17 0000 Signed





Impressions: 





 Service Date/Time:  Thursday, June 29, 2017 15:09 - CONCLUSION:  Comminuted 4 





 fragment intertrochanteric fracture of the right hip  Intact left hip and AP 





 pelvis.     Fletcher Medina MD 


 


Head CT 6/29/17 0000 Signed





Impressions: 





 Service Date/Time:  Thursday, June 29, 2017 15:23 - CONCLUSION:  1. Severe 





 diffuse cerebral atrophy.  2. Moderate periventricular and subcortical white 





 matter small vessels changes bilaterally.  3. No acute infarct, acute 





 hemorrhage, mass effect or extra-axial fluid collections.    Tomas Bang MD 


 


Femur X-Ray 6/29/17 0000 Signed





Impressions: 





 Service Date/Time:  Thursday, June 29, 2017 15:04 - CONCLUSION:  Proximal 

right 





 femur fracture     Anatoliy Reyna MD 


 


Chest X-Ray 6/29/17 0000 Signed





Impressions: 





 Service Date/Time:  Thursday, June 29, 2017 15:23 - CONCLUSION:   1.  No acute 





 cardiopulmonary disease.   2.  Degenerative changes and scoliosis of the 





 thoracolumbar spine.     Tomas Bang MD 


 


Cervical Spine CT 6/29/17 0000 Signed





Impressions: 





 Service Date/Time:  Thursday, June 29, 2017 15:23 - CONCLUSION:  1.  No acute 





 fracture or prevertebral soft tissue swelling. 2.  Moderate spinal stenosis 

and 





 bilateral foraminal narrowing at C4-5.  3.  Moderate bilateral foraminal 





 narrowing but no spinal stenosis at C3-4, C5-6 and C6-7.  4.  Grade I 





 retrolisthesis of C4 in relation to C3 and C5.  5.  Grade I anterolisthesis of 





 C7 in relation to T1.  6.  Diffuse cervical spondylosis and scoliosis of the 





 cervical spine.    Tomas Bang MD 








Objective Remarks


Right lower extremity: Intact skin over hip. Shortening of the leg. Pain with 

palpation of proximal femur. No pain with knee or ankle range of motion. 

Distally intact sensation is able to move all toes.


Left lower extremity: Full range of motion neurovascularly intact


Bilateral upper extremities no pain with range of motion and neurovascularly 

intact





Assessment & Plan


Assessment and Plan


Right intertrochanteric femur fracture


Nothing by mouth


Sign consents


Surgery this morning for intramedullary nail fixation








Gerber Calloway Jr. Jun 30, 2017 07:00

## 2017-06-30 NOTE — PD.OP
cc:   Ovidio Tran MD


__________________________________________________





Operative Report


Date of Surgery:  Jun 30, 2017


Preoperative Diagnosis:  


Comminuted right proximal femur subtrochanteric and intertrochanteric fractures


Postoperative Diagnosis:  


Procedure:


Reduction and intramedullary nail fixation right femur intertrochanteric and 

subtrochanteric fractures


Anesthesia:


Gen.


Surgeon:


Ovidio Tran


Assistant(s):


MAYO James PA-C


 


The surgical procedure was assisted by my physician assistant. My P.A. presence 

was necessary throughout this case for the manipulation and positioning of the 

surgical extremity. My P.A. was assisting me throughout the duration of this 

procedure. The skill set of a physician assistant was medically necessary to 

complete this procedure. During the surgical case the surgical tech was working 

at the back table and the physician assistant was directly assisting me.


Operation and Findings:


Implants used: [12]mm x [340]mm 130 Synthes TFNA  troch nail





Plan of activity: 50% weightbearing right leg





Patient was seen and evaluated preoperatively.  The patient has significant hip 

pain from proximal femur fracture.  The risk and benefits of surgery were 

discussed in depth with the patient and her family to include bleeding, 

infection, nonunion, malunion,  need for hip replacement, painful hardware, as 

well as medical competitions including blood clots, stroke, heart attack, and 

death.  Informed consent was obtained.  Operative site was marked.  Patient was 

brought to the operating room and placed on fracture table.  IV sedation was 

administered by anesthesiologist.  Timeout procedure was performed.  Hip and 

leg were prepped with alcohol followed by DuraPrep and draped in the usual 

sterile fashion.  IV antibiotics were given prior to incision.





Procedure began with reduction of fracture.  Traction was applied.  The leg was 

manipulated to achieve reduction.  Excellent reduction was achieved.  

Fluoroscopy was used to confirm reduction.  A three inch incision was made 

proximal to the trochanter.  Subcutaneous tissue was dissected bluntly.  

Guidepin was placed at the tip of the trochanter and advanced into the femoral 

canal.  Fluoroscopy confirmed appropriate guidepin placement.  A opening reamer 

was placed over the guidepin.  A long ball tipped guide pin was now placed down 

the femoral canal into the center of the distal femur.  The nail length was now 

measured.  Fluoroscopy confirmed appropriate guidepin placement.  Flexible 

reamers were now passed over the guidepin to ream the intramedullary canal.





The Synthes TFNA nail was attached to the insertion handle.  Nail was now 

placed over the guidepin into the femoral canal.  Fluoroscopy confirmed 

appropriate nail placement.  A second incision was made over the lateral thigh.

  Cannulas were placed through the insertion handle down to the femur.  

Guidepin was now placed through the femoral nail into the center of the femoral 

head.  Fluoroscopy confirmed appropriate guidepin placement.  Screw length was 

measured.  Cannulated drill was placed over the guidepin.  Appropriate length 

lag screw was now placed.  Traction was released and compression was applied. 

The set screw was now tightened in dynamic mode.





Next, using perfect [place]ie technique two distal interlocking screws were 

placed.  Screw holes were predrilled and screw lengths were measured.  Final 

fluoroscopy revealed well aligned fracture with well-placed hardware.  Incision 

was closed with 3-0 Vicryl and staples.  Sterile dressings were applied.  

Patient was awakened and transferred to recovery room.








Ovidio Tran MD Jun 30, 2017 09:32

## 2017-06-30 NOTE — EKG
Date Performed: 06/29/2017       Time Performed: 16:17:47

 

PTAGE:      84 years

 

EKG:      Sinus rhythm 

 

 Nonspecific ST-T wave changes COMPARED TO PRIOR ELECTROCARDIOGRAM, Rate has increased. 

 

 PREVIOUS TRACING            : 08/23/2014 23.40

 

DOCTOR:   Soren Dunn  Interpretating Date/Time  06/30/2017 05:06:57

## 2017-07-01 VITALS
OXYGEN SATURATION: 94 % | TEMPERATURE: 97.8 F | HEART RATE: 96 BPM | DIASTOLIC BLOOD PRESSURE: 56 MMHG | SYSTOLIC BLOOD PRESSURE: 136 MMHG | RESPIRATION RATE: 20 BRPM

## 2017-07-01 VITALS
TEMPERATURE: 97.4 F | OXYGEN SATURATION: 94 % | RESPIRATION RATE: 15 BRPM | HEART RATE: 79 BPM | SYSTOLIC BLOOD PRESSURE: 101 MMHG | DIASTOLIC BLOOD PRESSURE: 59 MMHG

## 2017-07-01 VITALS
TEMPERATURE: 96.7 F | HEART RATE: 98 BPM | DIASTOLIC BLOOD PRESSURE: 67 MMHG | OXYGEN SATURATION: 93 % | RESPIRATION RATE: 20 BRPM | SYSTOLIC BLOOD PRESSURE: 144 MMHG

## 2017-07-01 VITALS
OXYGEN SATURATION: 95 % | RESPIRATION RATE: 16 BRPM | DIASTOLIC BLOOD PRESSURE: 59 MMHG | SYSTOLIC BLOOD PRESSURE: 110 MMHG | HEART RATE: 73 BPM | TEMPERATURE: 96.3 F

## 2017-07-01 VITALS — HEART RATE: 63 BPM

## 2017-07-01 VITALS
TEMPERATURE: 96.5 F | HEART RATE: 65 BPM | OXYGEN SATURATION: 96 % | SYSTOLIC BLOOD PRESSURE: 90 MMHG | RESPIRATION RATE: 16 BRPM | DIASTOLIC BLOOD PRESSURE: 50 MMHG

## 2017-07-01 VITALS
SYSTOLIC BLOOD PRESSURE: 130 MMHG | DIASTOLIC BLOOD PRESSURE: 87 MMHG | TEMPERATURE: 98 F | HEART RATE: 181 BPM | OXYGEN SATURATION: 100 % | RESPIRATION RATE: 22 BRPM

## 2017-07-01 VITALS
DIASTOLIC BLOOD PRESSURE: 75 MMHG | SYSTOLIC BLOOD PRESSURE: 99 MMHG | OXYGEN SATURATION: 96 % | HEART RATE: 84 BPM | RESPIRATION RATE: 16 BRPM | TEMPERATURE: 97.6 F

## 2017-07-01 VITALS — HEART RATE: 62 BPM

## 2017-07-01 VITALS
DIASTOLIC BLOOD PRESSURE: 78 MMHG | OXYGEN SATURATION: 100 % | SYSTOLIC BLOOD PRESSURE: 117 MMHG | HEART RATE: 178 BPM | RESPIRATION RATE: 18 BRPM

## 2017-07-01 VITALS
DIASTOLIC BLOOD PRESSURE: 57 MMHG | TEMPERATURE: 96.6 F | OXYGEN SATURATION: 100 % | HEART RATE: 77 BPM | RESPIRATION RATE: 20 BRPM | SYSTOLIC BLOOD PRESSURE: 113 MMHG

## 2017-07-01 VITALS
DIASTOLIC BLOOD PRESSURE: 54 MMHG | SYSTOLIC BLOOD PRESSURE: 100 MMHG | RESPIRATION RATE: 16 BRPM | HEART RATE: 63 BPM | TEMPERATURE: 97.3 F | OXYGEN SATURATION: 100 %

## 2017-07-01 VITALS
HEART RATE: 98 BPM | DIASTOLIC BLOOD PRESSURE: 67 MMHG | OXYGEN SATURATION: 93 % | TEMPERATURE: 96.7 F | RESPIRATION RATE: 20 BRPM | SYSTOLIC BLOOD PRESSURE: 144 MMHG

## 2017-07-01 VITALS
DIASTOLIC BLOOD PRESSURE: 78 MMHG | RESPIRATION RATE: 18 BRPM | HEART RATE: 178 BPM | OXYGEN SATURATION: 100 % | SYSTOLIC BLOOD PRESSURE: 117 MMHG

## 2017-07-01 VITALS — HEART RATE: 96 BPM

## 2017-07-01 VITALS — HEART RATE: 76 BPM

## 2017-07-01 VITALS — HEART RATE: 68 BPM

## 2017-07-01 VITALS
SYSTOLIC BLOOD PRESSURE: 117 MMHG | DIASTOLIC BLOOD PRESSURE: 65 MMHG | HEART RATE: 77 BPM | RESPIRATION RATE: 16 BRPM | TEMPERATURE: 98.8 F | OXYGEN SATURATION: 99 %

## 2017-07-01 VITALS — HEART RATE: 181 BPM

## 2017-07-01 VITALS — HEART RATE: 175 BPM

## 2017-07-01 VITALS — HEART RATE: 70 BPM

## 2017-07-01 LAB
ALP SERPL-CCNC: 66 U/L (ref 45–117)
ALT SERPL-CCNC: 14 U/L (ref 10–53)
ANION GAP SERPL CALC-SCNC: 7 MEQ/L (ref 5–15)
AST SERPL-CCNC: 27 U/L (ref 15–37)
BASOPHILS # BLD AUTO: 0 TH/MM3 (ref 0–0.2)
BASOPHILS # BLD AUTO: 0 TH/MM3 (ref 0–0.2)
BASOPHILS NFR BLD: 0 % (ref 0–2)
BASOPHILS NFR BLD: 0.4 % (ref 0–2)
BILIRUB SERPL-MCNC: 0.4 MG/DL (ref 0.2–1)
BUN SERPL-MCNC: 25 MG/DL (ref 7–18)
CHLORIDE SERPL-SCNC: 111 MEQ/L (ref 98–107)
CK MB SERPL-MCNC: 1.6 NG/ML (ref 0.5–3.6)
CK MB SERPL-MCNC: 2 NG/ML (ref 0.5–3.6)
CK SERPL-CCNC: 460 U/L (ref 26–192)
EOSINOPHIL # BLD: 0 TH/MM3 (ref 0–0.4)
EOSINOPHIL # BLD: 0 TH/MM3 (ref 0–0.4)
EOSINOPHIL NFR BLD: 0 % (ref 0–4)
EOSINOPHIL NFR BLD: 0.2 % (ref 0–4)
ERYTHROCYTE [DISTWIDTH] IN BLOOD BY AUTOMATED COUNT: 13.7 % (ref 11.6–17.2)
ERYTHROCYTE [DISTWIDTH] IN BLOOD BY AUTOMATED COUNT: 13.7 % (ref 11.6–17.2)
GFR SERPLBLD BASED ON 1.73 SQ M-ARVRAT: 108 ML/MIN (ref 89–?)
HCO3 BLD-SCNC: 25.3 MEQ/L (ref 21–32)
HCT VFR BLD CALC: 18.2 % (ref 35–46)
HCT VFR BLD CALC: 29.8 % (ref 35–46)
HEMO FLAGS: (no result)
HEMO FLAGS: (no result)
LYMPHOCYTES # BLD AUTO: 0.8 TH/MM3 (ref 1–4.8)
LYMPHOCYTES # BLD AUTO: 1.4 TH/MM3 (ref 1–4.8)
LYMPHOCYTES NFR BLD AUTO: 14.9 % (ref 9–44)
LYMPHOCYTES NFR BLD AUTO: 9.1 % (ref 9–44)
MCH RBC QN AUTO: 29.1 PG (ref 27–34)
MCH RBC QN AUTO: 29.8 PG (ref 27–34)
MCHC RBC AUTO-ENTMCNC: 33.4 % (ref 32–36)
MCHC RBC AUTO-ENTMCNC: 34 % (ref 32–36)
MCV RBC AUTO: 87 FL (ref 80–100)
MCV RBC AUTO: 87.6 FL (ref 80–100)
MONOCYTES NFR BLD: 10.4 % (ref 0–8)
MONOCYTES NFR BLD: 8.8 % (ref 0–8)
NEUTROPHILS # BLD AUTO: 6.8 TH/MM3 (ref 1.8–7.7)
NEUTROPHILS # BLD AUTO: 7.2 TH/MM3 (ref 1.8–7.7)
NEUTROPHILS NFR BLD AUTO: 75.7 % (ref 16–70)
NEUTROPHILS NFR BLD AUTO: 80.5 % (ref 16–70)
PLATELET # BLD: 141 TH/MM3 (ref 150–450)
PLATELET # BLD: 144 TH/MM3 (ref 150–450)
POTASSIUM SERPL-SCNC: 3.9 MEQ/L (ref 3.5–5.1)
RBC # BLD AUTO: 2.09 MIL/MM3 (ref 4–5.3)
RBC # BLD AUTO: 3.4 MIL/MM3 (ref 4–5.3)
SODIUM SERPL-SCNC: 143 MEQ/L (ref 136–145)
WBC # BLD AUTO: 8.4 TH/MM3 (ref 4–11)
WBC # BLD AUTO: 9.5 TH/MM3 (ref 4–11)

## 2017-07-01 PROCEDURE — 30233N1 TRANSFUSION OF NONAUTOLOGOUS RED BLOOD CELLS INTO PERIPHERAL VEIN, PERCUTANEOUS APPROACH: ICD-10-PCS | Performed by: FAMILY MEDICINE

## 2017-07-01 RX ADMIN — FAMOTIDINE SCH MG: 10 INJECTION, SOLUTION INTRAVENOUS at 09:24

## 2017-07-01 RX ADMIN — CALCIUM CARBONATE-CHOLECALCIFEROL TAB 250 MG-125 UNIT SCH MG: 250-125 TAB at 13:00

## 2017-07-01 RX ADMIN — INSULIN ASPART SCH: 100 INJECTION, SOLUTION INTRAVENOUS; SUBCUTANEOUS at 16:00

## 2017-07-01 RX ADMIN — MORPHINE SULFATE PRN MG: 2 INJECTION, SOLUTION INTRAMUSCULAR; INTRAVENOUS at 11:57

## 2017-07-01 RX ADMIN — MORPHINE SULFATE PRN MG: 2 INJECTION, SOLUTION INTRAMUSCULAR; INTRAVENOUS at 04:58

## 2017-07-01 RX ADMIN — INSULIN ASPART SCH: 100 INJECTION, SOLUTION INTRAVENOUS; SUBCUTANEOUS at 20:23

## 2017-07-01 RX ADMIN — ENOXAPARIN SODIUM SCH MG: 30 INJECTION SUBCUTANEOUS at 09:24

## 2017-07-01 RX ADMIN — DOCUSATE SODIUM SCH MG: 100 CAPSULE, LIQUID FILLED ORAL at 20:19

## 2017-07-01 RX ADMIN — SODIUM CHLORIDE, PRESERVATIVE FREE SCH ML: 5 INJECTION INTRAVENOUS at 20:19

## 2017-07-01 RX ADMIN — CHOLECALCIFEROL CAP 125 MCG (5000 UNIT) SCH UNITS: 125 CAP at 09:00

## 2017-07-01 RX ADMIN — PHENYTOIN SODIUM SCH MLS/HR: 50 INJECTION INTRAMUSCULAR; INTRAVENOUS at 20:18

## 2017-07-01 RX ADMIN — FAMOTIDINE SCH MG: 10 INJECTION, SOLUTION INTRAVENOUS at 20:20

## 2017-07-01 RX ADMIN — MORPHINE SULFATE PRN MG: 2 INJECTION, SOLUTION INTRAMUSCULAR; INTRAVENOUS at 14:00

## 2017-07-01 RX ADMIN — SODIUM CHLORIDE, PRESERVATIVE FREE SCH ML: 5 INJECTION INTRAVENOUS at 09:00

## 2017-07-01 RX ADMIN — MORPHINE SULFATE PRN MG: 2 INJECTION, SOLUTION INTRAMUSCULAR; INTRAVENOUS at 22:39

## 2017-07-01 RX ADMIN — INSULIN ASPART SCH: 100 INJECTION, SOLUTION INTRAVENOUS; SUBCUTANEOUS at 05:02

## 2017-07-01 RX ADMIN — DOCUSATE SODIUM SCH MG: 100 CAPSULE, LIQUID FILLED ORAL at 09:00

## 2017-07-01 RX ADMIN — CALCIUM CARBONATE-CHOLECALCIFEROL TAB 250 MG-125 UNIT SCH MG: 250-125 TAB at 18:00

## 2017-07-01 RX ADMIN — SODIUM CHLORIDE SCH MLS/HR: 900 INJECTION INTRAVENOUS at 18:00

## 2017-07-01 RX ADMIN — PHENYTOIN SODIUM SCH MLS/HR: 50 INJECTION INTRAMUSCULAR; INTRAVENOUS at 04:21

## 2017-07-01 RX ADMIN — CALCIUM CARBONATE-CHOLECALCIFEROL TAB 250 MG-125 UNIT SCH MG: 250-125 TAB at 09:00

## 2017-07-01 NOTE — HHI.PR
Subjective


Remarks


called this am  hgb low 6.1  type cross and transfusion ordered  hgb was 11 

prior to  surgery  pt now resting quietly  vss afebrile





Objective





 Vital Signs








  Date Time  Temp Pulse Resp B/P Pulse Ox O2 Delivery O2 Flow Rate FiO2


 


7/1/17 06:10 97.4 79 15 101/59 94   


 


7/1/17 05:59 97.6 84 16 99/75 96   


 


7/1/17 04:00 96.3 73 16 110/59 95   


 


7/1/17 00:00 96.5 65 16 90/50 96   


 


6/30/17 20:56  90      


 


6/30/17 20:50 97.3 80 16 103/50 95   


 


6/30/17 17:55     94 Nasal Cannula 2.00 


 


6/30/17 16:00 96.9 78 16 100/61 94   


 


6/30/17 12:00 97.2 62 16 128/69 100   


 


6/30/17 11:30  70 15 131/60 98 Nasal Cannula 2 


 


6/30/17 11:15 97.5 72 15 131/59 98 Nasal Cannula 2 


 


6/30/17 11:00  71 15 138/63 96 Nasal Cannula 2 


 


6/30/17 10:45  73 15 140/65 99 Nasal Cannula 3 


 


6/30/17 10:30  74 14 143/67 98 Nasal Cannula 3 


 


6/30/17 10:15  78 14 145/62 97 Nasal Cannula 3 


 


6/30/17 10:00  80 13 141/68 96 Nasal Cannula 3 


 


6/30/17 09:53 97.6 91 13 164/63 100 Simple Mask 7 








 I/O








 6/30/17 6/30/17 6/30/17 7/1/17 7/1/17 7/1/17





 07:00 15:00 23:00 07:00 15:00 23:00


 


Intake Total 840 ml 1270 ml 240 ml 640 ml  


 


Output Total 150 ml 500 ml 300 ml 250 ml  


 


Balance 690 ml 770 ml -60 ml 390 ml  


 


      


 


Intake Oral 0 ml 120 ml 240 ml   


 


IV Total 840 ml 150 ml  640 ml  


 


Other  1000 ml    


 


Output Urine Total 150 ml 450 ml 300 ml 250 ml  


 


Estimated Blood Loss  50 ml    


 


# Bowel Movements 0  0 0  








Result Diagram:  


7/1/17 0127                                                                    

            6/30/17 2242





Imaging





Last 48 hours Impressions








Hip X-Ray 6/30/17 0000 Signed





Impressions: 





 Service Date/Time:  Friday, June 30, 2017 09:26 - CONCLUSION:  Anatomic 





 alignment.    Bob Lucia MD  FACR








Procedures


orif hip


Objective Remarks


GENERAL: somnolent


SKIN: Warm and dry.


HEAD: Atraumatic. Normocephalic. 


EYES: Pupils equal and round. No scleral icterus. No injection or drainage. 


ENT: No nasal bleeding or discharge.  Mucous membranes pink and moist.


NECK: Trachea midline. No JVD. 


CARDIOVASCULAR: Regular rate and rhythm.  


RESPIRATORY: No accessory muscle use. Clear to auscultation. Breath sounds 

equal bilaterally. 


GASTROINTESTINAL: Abdomen soft, non-tender, nondistended. Hepatic and splenic 

margins not palpable. 


MUSCULOSKELETAL: Extremities without clubbing, cyanosis, or edema. No obvious 

deformities. hip incision stable  


NEUROLOGICAL: asleep post op No obvious cranial nerve deficits.  Motor grossly 

within normal limits.   end stage alzheimers


PSYCHIATRIC:confused


Medications and IVs





 Inpatient Medications


Acetaminophen (Tylenol) 650 mg Q4H  PRN PO FEVER;  Start 6/29/17 at 16:15


Acetaminophen/ Hydrocodone Bitart (Norco  5-325 Mg) 1 tab Q4H  PRN PO PAIN 1-10 

Last administered on 6/30/17at 01:33;  Start 6/30/17 at 00:15


Calcium/Vitamin D (Oscal-D 250-125) 250 mg TID PO ;  Start 6/30/17 at 13:00


Cefazolin Sodium/ Sodium Chloride (Ancef Inj/NS Inj) 100 ml @  200 mls/hr Q8H 

IV ;  Start 6/30/17 at 09:45;  Stop 6/30/17 at 10:18;  Status DC


Ceftriaxone Sodium 1000 mg/ Sodium Chloride 100 ml @  200 mls/hr Q24H IV  Last 

administered on 6/30/17at 18:06;  Start 6/29/17 at 18:00


Chlorhexidine Gluconate (Chlorhexidine 2% Cloth) 3 pack ON CALL  PRN TOPICAL 

SEE LABEL COMMENTS;  Start 6/29/17 at 22:00;  Stop 7/2/17 at 21:59


Cholecalciferol (Vitamin D3) 5,000 units DAILY PO ;  Start 7/1/17 at 09:00


Dextrose (D50w (Vial) Inj) 50 ml UNSCH  PRN IV HYPOGLYCEMIA-SEE COMMENTS;  

Start 6/30/17 at 13:30


Docusate Sodium (Colace) 100 mg BID PO ;  Start 6/29/17 at 21:00


Enoxaparin Sodium 30 mg 30 mg Q24H SQ ;  Start 7/1/17 at 09:00


Ergocalciferol (Drisdol) 50,000 units ONCE  ONCE PO ;  Start 6/30/17 at 10:00;  

Stop 6/30/17 at 10:01;  Status DC


Famotidine (Pepcid Inj) 20 mg Q12HR IV PUSH  Last administered on 6/30/17at 20:

46;  Start 6/30/17 at 13:30


Glucagon (Glucagon Inj) 1 mg UNSCH  PRN OTHER HYPOGLYCEMIA-SEE COMMENTS;  Start 

6/30/17 at 13:30


Insulin Aspart (NovoLOG SUPPLEMENTAL SCALE) 1 ACHS SLIDING  SCALE SQ  Last 

administered on 6/30/17at 20:40;  Start 6/30/17 at 16:00


Insulin Human Regular (NovoLIN R INJ) See Protocol Table ... ON CALL  PRN SQ 

SEE PROTOCOL TABLE;  Start 6/29/17 at 22:00;  Stop 7/2/17 at 21:59


IV Flush (NS Flush) 2 ml BID IVF ;  Start 6/30/17 at 21:00


Ketamine HCl 5 mg 5 mg ONCE  ONCE IV PUSH  Last administered on 6/29/17at 14:43

;  Start 6/29/17 at 14:45;  Stop 6/29/17 at 14:46;  Status DC


Lactated Ringer's 1,000 ml @  30 mls/hr Q24H PRN IV SEE LABEL COMMENTS;  Start 6 /29/17 at 22:00;  Stop 7/2/17 at 21:59


Metoprolol Tartrate (Lopressor) 25 mg ON CALL  PRN PO SEE LABEL COMMENTS;  

Start 6/29/17 at 22:00;  Stop 7/2/17 at 21:59


Miscellaneous Information ALL NURSING DEPARTME... UNSCH  PRN .XX SEE LABEL 

COMMENTS;  Start 6/30/17 at 09:46;  Stop 7/1/17 at 09:45


Morphine Sulfate (Morphine Inj) 3 mg Q3H  PRN IV PUSH break thru pain Last 

administered on 7/1/17at 04:58;  Start 6/30/17 at 09:45


Naloxone HCl 0.4 mg 0.4 mg UNSCH  PRN IV RESPIRATORY RATE LESS THAN 10;  Start 6 /29/17 at 16:15


Ondansetron HCl (Zofran Inj) 4 mg Q6H  PRN IVP NAUSEA OR VOMITING;  Start 6/29/ 17 at 16:15


Pneumococcal Polyvalent Vaccine (Pneumovax-23 Inj) 25 mcg ONCE ONCE IM ;  Start 

6/30/17 at 10:00;  Stop 6/30/17 at 10:00;  Status DC


Povidone Iodine (Betadine 5% Antisepsis Kit) 1 applic ON CALL  PRN EACH NARE 

SEE LABEL COMMENTS;  Start 6/29/17 at 22:00;  Stop 7/2/17 at 21:59


Sodium Chloride (NS 1000 ml Inj) 1,000 ml @  83 mls/hr Q12H3M IV  Last 

administered on 6/30/17at 16:18;  Start 6/29/17 at 16:12


Sodium Chloride ( ml Inj) 500 ml @  30 mls/hr [id number] PRN IV SEE LABEL 

COMMENTS;  Start 6/29/17 at 22:00;  Stop 7/2/17 at 21:59


Sodium Chloride (NS Flush) 2 ml BID IV FLUSH ;  Start 6/29/17 at 21:00;  Stop 6/ 30/17 at 09:57;  Status DC





Assessment and Plan


Problem List:  


(1) Fracture, proximal femur


Status:  Acute


(2) Anemia


Status:  Acute


Plan:  type cross transfuse  2 units prbc





Assessment and Plan


post op   will probably require a sitter once awake





Problem Qualifiers





(1) Fracture, proximal femur:  


Qualified Code:  S72.001A - Fracture, proximal femur, right, closed, initial 

encounter





Tank Santacruz DO Jul 1, 2017 07:21

## 2017-07-01 NOTE — PD.ORT.PN
Subjective


Subjective Remarks


Patient has history of dementia.  and RN at bedside. Patient was 

transferred to the cardiac unit this morning due to tachycardia (HR 180s). Hgb 

6.1, receiving 2 units of PRBCs.





Objective


Vitals





 Vital Signs








  Date Time  Temp Pulse Resp B/P Pulse Ox O2 Delivery O2 Flow Rate FiO2


 


7/1/17 09:45 96.7 98 20 144/67 93   


 


7/1/17 09:44 96.7 98 20 144/67 93   


 


7/1/17 08:00 97.8 96 20 136/56 94   


 


7/1/17 06:10 97.4 79 15 101/59 94   


 


7/1/17 05:59 97.6 84 16 99/75 96   


 


7/1/17 04:00 96.3 73 16 110/59 95   


 


7/1/17 00:00 96.5 65 16 90/50 96   


 


6/30/17 20:56  90      


 


6/30/17 20:50 97.3 80 16 103/50 95   


 


6/30/17 17:55     94 Nasal Cannula 2.00 


 


6/30/17 16:00 96.9 78 16 100/61 94   








 I/O








 6/30/17 6/30/17 6/30/17 7/1/17 7/1/17 7/1/17





 06:59 14:59 22:59 06:59 14:59 22:59


 


Intake Total 840 ml 1150 ml 360 ml 640 ml  


 


Output Total 150 ml 300 ml 500 ml 250 ml  


 


Balance 690 ml 850 ml -140 ml 390 ml  


 


      


 


Intake Oral 0 ml 0 ml 360 ml   


 


IV Total 840 ml 150 ml  640 ml  


 


Other  1000 ml    


 


Output Urine Total 150 ml 250 ml 500 ml 250 ml  


 


Estimated Blood Loss  50 ml    


 


# Bowel Movements 0  0 0  








Result Diagram:  


7/1/17 0127                                                                    

            6/30/17 2242





Imaging





Last 72 hours Impressions








Hip and Pelvis X-Ray 6/29/17 0000 Signed





Impressions: 





 Service Date/Time:  Thursday, June 29, 2017 15:09 - CONCLUSION:  Comminuted 4 





 fragment intertrochanteric fracture of the right hip  Intact left hip and AP 





 pelvis.     Fletcher Medina MD 


 


Head CT 6/29/17 0000 Signed





Impressions: 





 Service Date/Time:  Thursday, June 29, 2017 15:23 - CONCLUSION:  1. Severe 





 diffuse cerebral atrophy.  2. Moderate periventricular and subcortical white 





 matter small vessels changes bilaterally.  3. No acute infarct, acute 





 hemorrhage, mass effect or extra-axial fluid collections.    Tomas Bang MD 


 


Femur X-Ray 6/29/17 0000 Signed





Impressions: 





 Service Date/Time:  Thursday, June 29, 2017 15:04 - CONCLUSION:  Proximal 

right 





 femur fracture     Anatoliy Reyna MD 


 


Chest X-Ray 6/29/17 0000 Signed





Impressions: 





 Service Date/Time:  Thursday, June 29, 2017 15:23 - CONCLUSION:   1.  No acute 





 cardiopulmonary disease.   2.  Degenerative changes and scoliosis of the 





 thoracolumbar spine.     Tomas Bang MD 


 


Cervical Spine CT 6/29/17 0000 Signed





Impressions: 





 Service Date/Time:  Thursday, June 29, 2017 15:23 - CONCLUSION:  1.  No acute 





 fracture or prevertebral soft tissue swelling. 2.  Moderate spinal stenosis 

and 





 bilateral foraminal narrowing at C4-5.  3.  Moderate bilateral foraminal 





 narrowing but no spinal stenosis at C3-4, C5-6 and C6-7.  4.  Grade I 





 retrolisthesis of C4 in relation to C3 and C5.  5.  Grade I anterolisthesis of 





 C7 in relation to T1.  6.  Diffuse cervical spondylosis and scoliosis of the 





 cervical spine.    Tomas Bang MD 








Objective Remarks


Right lower extremity:


dressing C/D/I


minimal swelling


calves soft





Assessment & Plan


Assessment and Plan


POD # 1 Reduction and intramedullary nail fixation right femur 

intertrochanteric and subtrochanteric fractures


Pain management - Norco


DVT prophylaxis - Lovenox


Physical therapy - 50% weight bearing


Initiate daily dressing changes on POD 2


D/C planning - anticipating SNF


Monitor








Elvin Bueno Jul 1, 2017 15:24

## 2017-07-01 NOTE — MB
cc:

PORFIRIO DE LEON OTAKAR

****

 

 

DATE OF CONSULTATION:

07/01/2017

 

IMPRESSIONS:

1. Supraventricular tachycardia.

2. Right hip fracture, no immediate postop open reduction internal fixation of

     A proximal femoral fracture.

3. Advanced age.

4. Advanced dementia.

5. Profound anemia, postoperative bleeding.

 

RECOMMENDATIONS

1. Transfuse to maintain hematocrit 25% or greater.

2. High converted the patient from PSVT to sinus rhythm with carotid sinus

     massage.

3. The patient's blood pressures been problematic / low, we will use digoxin as

     AD blocking drug at this point in time this may be changed later on.

 

CLINICAL DATA

Mrs. Moreau is a 84-year-old female who broke her right femur,  no one knows

how this happened.  There were no witnessed falls. Her  is her

caregiver, she Is unable to communicate with him. She is a retired nurse.  She

does not use alcohol.  Does not smoke.  She has no history of hypertension,

diabetes or dyslipidemia.

 

She apparently had two episodes of paroxysmal A fib / PSVT within the past

several years require emergency room evaluations.

 

MEDICATIONS:

Her medications at the time of admission included

___________ of 50 mg daily.

 

PAST MEDICAL HISTORY:

She has no history of myocardial infarction, congestive heart failure or

ventricular arrhythmias.  No history of seizure, stroke or TIA.  No history of

lung disease.  She has never smoked.  No history of GI bleeding.  No history of

liver or kidney disease.

 

 

 

 

PAST SURGICAL HISTORY:

Includes appendectomy.

 

PHYSICAL EXAMINATION

At this time demonstrates a elderly, frail appearing female laying in bed on

her side.  She has an irregular rhythm, rate of about 160-170.

HEAD, EYES, EARS, NOSE, AND THROAT: Exam demonstrates anicteric sclera.  There

are no bruits.  Jugular venous pressures are not elevated.  She is tachypenic,

her lungs are clear however.

CARDIOVASCULAR SYSTEM: Regular rate and rhythm, no gallops noted. 1/6 systolic

ejection murmur.

ABDOMEN: Abdominal exam the and soft, nontender.  Peripheral pulses are

palpable.  An electrocardiogram on admission demonstrated a normal sinus

rhythm, poor R-wave progression, normal axis and nonspecific ST-T changes.

Electrocardiogram apparently was done last night, not retrievable from the

medical record.

 

LABORATORY FINDINGS

Most recent hematocrit 18 with a hemoglobin of 6. platelet count 144.  Her most

recent lytes 143, 3.9 01/10 26 with a BUN of 23, creatinine 0.6 random blood

sugar 148, calcium 7.7.  Albumin was 3.2 on admission.

 

DISCUSSION

This is an elderly female with advanced dementia, unable to speak who is

developed SVT, postop repair of a fractured right femur.  We were able to

convert her with carotid sinus massage as her blood pressure has been low we

will use digoxin as an AV blocking drug.

 

 

 

 

                              _________________________________

                              DO ZEHRA Montez/sterling

D:  7/1/2017/1:43 PM

T:  7/1/2017/2:19 PM

Visit #:  [id number]

Job #:  [id number]

## 2017-07-02 VITALS — HEART RATE: 74 BPM

## 2017-07-02 VITALS
DIASTOLIC BLOOD PRESSURE: 59 MMHG | TEMPERATURE: 98.4 F | SYSTOLIC BLOOD PRESSURE: 149 MMHG | OXYGEN SATURATION: 97 % | RESPIRATION RATE: 16 BRPM | HEART RATE: 87 BPM

## 2017-07-02 VITALS — HEART RATE: 63 BPM

## 2017-07-02 VITALS — HEART RATE: 61 BPM

## 2017-07-02 VITALS
OXYGEN SATURATION: 95 % | HEART RATE: 84 BPM | SYSTOLIC BLOOD PRESSURE: 141 MMHG | TEMPERATURE: 98.6 F | RESPIRATION RATE: 20 BRPM | DIASTOLIC BLOOD PRESSURE: 67 MMHG

## 2017-07-02 VITALS
SYSTOLIC BLOOD PRESSURE: 145 MMHG | DIASTOLIC BLOOD PRESSURE: 77 MMHG | RESPIRATION RATE: 16 BRPM | TEMPERATURE: 97.8 F | HEART RATE: 79 BPM | OXYGEN SATURATION: 95 %

## 2017-07-02 VITALS
RESPIRATION RATE: 18 BRPM | TEMPERATURE: 98.7 F | DIASTOLIC BLOOD PRESSURE: 60 MMHG | HEART RATE: 61 BPM | SYSTOLIC BLOOD PRESSURE: 120 MMHG | OXYGEN SATURATION: 98 %

## 2017-07-02 VITALS
RESPIRATION RATE: 14 BRPM | TEMPERATURE: 98.8 F | SYSTOLIC BLOOD PRESSURE: 146 MMHG | DIASTOLIC BLOOD PRESSURE: 72 MMHG | HEART RATE: 73 BPM | OXYGEN SATURATION: 98 %

## 2017-07-02 VITALS
HEART RATE: 77 BPM | TEMPERATURE: 97 F | DIASTOLIC BLOOD PRESSURE: 56 MMHG | OXYGEN SATURATION: 94 % | RESPIRATION RATE: 17 BRPM | SYSTOLIC BLOOD PRESSURE: 128 MMHG

## 2017-07-02 VITALS — HEART RATE: 71 BPM

## 2017-07-02 VITALS
OXYGEN SATURATION: 95 % | TEMPERATURE: 98.5 F | HEART RATE: 78 BPM | SYSTOLIC BLOOD PRESSURE: 153 MMHG | RESPIRATION RATE: 16 BRPM | DIASTOLIC BLOOD PRESSURE: 74 MMHG

## 2017-07-02 VITALS — HEART RATE: 78 BPM

## 2017-07-02 VITALS — HEART RATE: 80 BPM

## 2017-07-02 VITALS — HEART RATE: 72 BPM

## 2017-07-02 LAB
ALP SERPL-CCNC: 87 U/L (ref 45–117)
ALT SERPL-CCNC: 15 U/L (ref 10–53)
ANION GAP SERPL CALC-SCNC: 3 MEQ/L (ref 5–15)
AST SERPL-CCNC: 23 U/L (ref 15–37)
BASOPHILS # BLD AUTO: 0 TH/MM3 (ref 0–0.2)
BASOPHILS # BLD AUTO: 0 TH/MM3 (ref 0–0.2)
BASOPHILS NFR BLD: 0.3 % (ref 0–2)
BASOPHILS NFR BLD: 0.4 % (ref 0–2)
BILIRUB SERPL-MCNC: 0.8 MG/DL (ref 0.2–1)
BUN SERPL-MCNC: 20 MG/DL (ref 7–18)
CHLORIDE SERPL-SCNC: 113 MEQ/L (ref 98–107)
EOSINOPHIL # BLD: 0 TH/MM3 (ref 0–0.4)
EOSINOPHIL # BLD: 0 TH/MM3 (ref 0–0.4)
EOSINOPHIL NFR BLD: 0.2 % (ref 0–4)
EOSINOPHIL NFR BLD: 0.6 % (ref 0–4)
ERYTHROCYTE [DISTWIDTH] IN BLOOD BY AUTOMATED COUNT: 13.3 % (ref 11.6–17.2)
ERYTHROCYTE [DISTWIDTH] IN BLOOD BY AUTOMATED COUNT: 13.8 % (ref 11.6–17.2)
GFR SERPLBLD BASED ON 1.73 SQ M-ARVRAT: 157 ML/MIN (ref 89–?)
HCO3 BLD-SCNC: 29.3 MEQ/L (ref 21–32)
HCT VFR BLD CALC: 27.4 % (ref 35–46)
HCT VFR BLD CALC: 28.1 % (ref 35–46)
HEMO FLAGS: (no result)
HEMO FLAGS: (no result)
LYMPHOCYTES # BLD AUTO: 1 TH/MM3 (ref 1–4.8)
LYMPHOCYTES # BLD AUTO: 1.1 TH/MM3 (ref 1–4.8)
LYMPHOCYTES NFR BLD AUTO: 11.5 % (ref 9–44)
LYMPHOCYTES NFR BLD AUTO: 13.4 % (ref 9–44)
MAGNESIUM SERPL-MCNC: 2 MG/DL (ref 1.5–2.5)
MCH RBC QN AUTO: 29.5 PG (ref 27–34)
MCH RBC QN AUTO: 29.8 PG (ref 27–34)
MCHC RBC AUTO-ENTMCNC: 34 % (ref 32–36)
MCHC RBC AUTO-ENTMCNC: 34.4 % (ref 32–36)
MCV RBC AUTO: 86.7 FL (ref 80–100)
MCV RBC AUTO: 86.7 FL (ref 80–100)
MONOCYTES NFR BLD: 8.8 % (ref 0–8)
MONOCYTES NFR BLD: 9.4 % (ref 0–8)
NEUTROPHILS # BLD AUTO: 6.4 TH/MM3 (ref 1.8–7.7)
NEUTROPHILS # BLD AUTO: 6.8 TH/MM3 (ref 1.8–7.7)
NEUTROPHILS NFR BLD AUTO: 76.6 % (ref 16–70)
NEUTROPHILS NFR BLD AUTO: 78.8 % (ref 16–70)
PLATELET # BLD: 129 TH/MM3 (ref 150–450)
PLATELET # BLD: 147 TH/MM3 (ref 150–450)
POTASSIUM SERPL-SCNC: 3.6 MEQ/L (ref 3.5–5.1)
RBC # BLD AUTO: 3.16 MIL/MM3 (ref 4–5.3)
RBC # BLD AUTO: 3.25 MIL/MM3 (ref 4–5.3)
SODIUM SERPL-SCNC: 145 MEQ/L (ref 136–145)
WBC # BLD AUTO: 8.4 TH/MM3 (ref 4–11)
WBC # BLD AUTO: 8.6 TH/MM3 (ref 4–11)

## 2017-07-02 RX ADMIN — INSULIN ASPART SCH: 100 INJECTION, SOLUTION INTRAVENOUS; SUBCUTANEOUS at 21:00

## 2017-07-02 RX ADMIN — SODIUM CHLORIDE, PRESERVATIVE FREE SCH ML: 5 INJECTION INTRAVENOUS at 08:58

## 2017-07-02 RX ADMIN — FAMOTIDINE SCH MG: 10 INJECTION, SOLUTION INTRAVENOUS at 08:57

## 2017-07-02 RX ADMIN — DOCUSATE SODIUM SCH MG: 100 CAPSULE, LIQUID FILLED ORAL at 22:17

## 2017-07-02 RX ADMIN — CALCIUM CARBONATE-CHOLECALCIFEROL TAB 250 MG-125 UNIT SCH MG: 250-125 TAB at 17:22

## 2017-07-02 RX ADMIN — PHENYTOIN SODIUM SCH MLS/HR: 50 INJECTION INTRAMUSCULAR; INTRAVENOUS at 05:36

## 2017-07-02 RX ADMIN — INSULIN ASPART SCH: 100 INJECTION, SOLUTION INTRAVENOUS; SUBCUTANEOUS at 11:00

## 2017-07-02 RX ADMIN — PHENYTOIN SODIUM SCH MLS/HR: 50 INJECTION INTRAMUSCULAR; INTRAVENOUS at 17:09

## 2017-07-02 RX ADMIN — SODIUM CHLORIDE SCH MLS/HR: 900 INJECTION INTRAVENOUS at 17:21

## 2017-07-02 RX ADMIN — HYDROCODONE BITARTRATE AND ACETAMINOPHEN PRN TAB: 5; 325 TABLET ORAL at 17:21

## 2017-07-02 RX ADMIN — HYDROCODONE BITARTRATE AND ACETAMINOPHEN PRN TAB: 5; 325 TABLET ORAL at 23:43

## 2017-07-02 RX ADMIN — SODIUM CHLORIDE, PRESERVATIVE FREE SCH ML: 5 INJECTION INTRAVENOUS at 22:17

## 2017-07-02 RX ADMIN — DOCUSATE SODIUM SCH MG: 100 CAPSULE, LIQUID FILLED ORAL at 08:58

## 2017-07-02 RX ADMIN — HYDROCODONE BITARTRATE AND ACETAMINOPHEN PRN TAB: 5; 325 TABLET ORAL at 12:55

## 2017-07-02 RX ADMIN — CALCIUM CARBONATE-CHOLECALCIFEROL TAB 250 MG-125 UNIT SCH MG: 250-125 TAB at 12:54

## 2017-07-02 RX ADMIN — CHOLECALCIFEROL CAP 125 MCG (5000 UNIT) SCH UNITS: 125 CAP at 08:58

## 2017-07-02 RX ADMIN — CALCIUM CARBONATE-CHOLECALCIFEROL TAB 250 MG-125 UNIT SCH MG: 250-125 TAB at 08:58

## 2017-07-02 RX ADMIN — DIGOXIN SCH MG: 125 TABLET ORAL at 08:57

## 2017-07-02 RX ADMIN — INSULIN ASPART SCH: 100 INJECTION, SOLUTION INTRAVENOUS; SUBCUTANEOUS at 06:39

## 2017-07-02 RX ADMIN — ENOXAPARIN SODIUM SCH MG: 30 INJECTION SUBCUTANEOUS at 08:56

## 2017-07-02 RX ADMIN — INSULIN ASPART SCH: 100 INJECTION, SOLUTION INTRAVENOUS; SUBCUTANEOUS at 16:00

## 2017-07-02 RX ADMIN — FAMOTIDINE SCH MG: 10 INJECTION, SOLUTION INTRAVENOUS at 22:17

## 2017-07-02 NOTE — EKG
Date Performed: 07/01/2017       Time Performed: 12:02:03

 

PTAGE:      84 years

 

EKG:      SUPRAVENTRICULAR TACHYCARDIA POSSIBLE ANTERIOR MYOCARDIAL INFARCTION , PROBABLY OLD Compare
d to previous tracing, the Supraventricular tachycardia is new. Clinical correlation is advised ABNOR
MAL RHYTHM ECG

 

PREVIOUS TRACING       : 06/29/2017 16.17

 

DOCTOR:   Matilda Ortega  Interpretating Date/Time  07/02/2017 13:42:04

## 2017-07-02 NOTE — HHI.PR
Subjective


Remarks


pt now in cardiac unit  hr stable  hgb 9.1   confused with sitter  will have pt 

eval for placement





Objective





 Vital Signs








  Date Time  Temp Pulse Resp B/P Pulse Ox O2 Delivery O2 Flow Rate FiO2


 


7/2/17 08:30  67      


 


7/2/17 08:30 98.8 73 14 146/72 98   


 


7/2/17 06:01  61      


 


7/2/17 05:05  74      


 


7/2/17 04:30 98.4 87 16 149/59 97   


 


7/2/17 04:30  71      


 


7/2/17 03:07  61      


 


7/2/17 02:10  63      


 


7/2/17 01:03  63      


 


7/2/17 00:05 98.7 62 18 120/60 98   


 


7/2/17 00:05  61      


 


7/1/17 23:35  63      


 


7/1/17 22:56  76      


 


7/1/17 21:00  70      


 


7/1/17 20:25 98.8 77 16 117/65 99   


 


7/1/17 20:00  68      


 


7/1/17 19:00  62      


 


7/1/17 18:00  63      


 


7/1/17 17:00  62      


 


7/1/17 16:00 97.3 63 16 100/54 100   


 


7/1/17 15:00  62      


 


7/1/17 14:25  96      


 


7/1/17 14:00  181      


 


7/1/17 13:00  175      


 


7/1/17 12:45 98.0 181 22 130/87 100   


 


7/1/17 12:45  181      


 


7/1/17 11:55  178 18 117/78 100   


 


7/1/17 11:50 96.6 77 20 113/57 100   








 I/O








 7/1/17 7/1/17 7/1/17 7/2/17 7/2/17 7/2/17





 07:00 15:00 23:00 07:00 15:00 23:00


 


Intake Total 640 ml  650 ml 1473 ml  


 


Output Total 250 ml  300 ml 450 ml  


 


Balance 390 ml  350 ml 1023 ml  


 


      


 


Intake Oral   0 ml 0 ml  


 


IV Total 640 ml  350 ml 1473 ml  


 


Packed Cells   300 ml   


 


Output Urine Total 250 ml  300 ml 450 ml  


 


# Bowel Movements 0   0  








Result Diagram:  


7/2/17 0526                                                                    

            7/1/17 2334





Procedures


orif hip


Objective Remarks


GENERAL: somnolent


SKIN: Warm and dry.


HEAD: Atraumatic. Normocephalic. 


EYES: Pupils equal and round. No scleral icterus. No injection or drainage. 


ENT: No nasal bleeding or discharge.  Mucous membranes pink and moist.


NECK: Trachea midline. No JVD. 


CARDIOVASCULAR: Regular rate and rhythm.  


RESPIRATORY: No accessory muscle use. Clear to auscultation. Breath sounds 

equal bilaterally. 


GASTROINTESTINAL: Abdomen soft, non-tender, nondistended. Hepatic and splenic 

margins not palpable. 


MUSCULOSKELETAL: Extremities without clubbing, cyanosis, or edema. No obvious 

deformities. hip incision stable  


NEUROLOGICAL: asleep post op No obvious cranial nerve deficits.  Motor grossly 

within normal limits.   end stage alzheimers


PSYCHIATRIC:confused


Medications and IVs





 Inpatient Medications


Acetaminophen (Tylenol) 650 mg Q4H  PRN PO FEVER;  Start 6/29/17 at 16:15


Acetaminophen/ Hydrocodone Bitart (Norco  5-325 Mg) 1 tab Q4H  PRN PO PAIN 1-10 

Last administered on 6/30/17at 01:33;  Start 6/30/17 at 00:15


Calcium/Vitamin D (Oscal-D 250-125) 250 mg TID PO  Last administered on 7/2/ 17at 08:58;  Start 6/30/17 at 13:00


Cefazolin Sodium/ Sodium Chloride (Ancef Inj/NS Inj) 100 ml @  200 mls/hr Q8H 

IV ;  Start 6/30/17 at 09:45;  Stop 6/30/17 at 10:18;  Status DC


Ceftriaxone Sodium 1000 mg/ Sodium Chloride 100 ml @  200 mls/hr Q24H IV  Last 

administered on 7/1/17at 18:00;  Start 6/29/17 at 18:00


Chlorhexidine Gluconate (Chlorhexidine 2% Cloth) 3 pack ON CALL  PRN TOPICAL 

SEE LABEL COMMENTS;  Start 6/29/17 at 22:00;  Stop 7/2/17 at 21:59


Cholecalciferol (Vitamin D3) 5,000 units DAILY PO  Last administered on 7/2/ 17at 08:58;  Start 7/1/17 at 09:00


Dextrose (D50w (Vial) Inj) 50 ml UNSCH  PRN IV HYPOGLYCEMIA-SEE COMMENTS;  

Start 6/30/17 at 13:30


Digoxin (Lanoxin Inj) 0.125 mg DAILY  PRN IV PUSH SEE LABEL COMMENTS;  Start 7/1 /17 at 19:45


Digoxin (Lanoxin) 0.125 mg DAILY PO  Last administered on 7/2/17at 08:57;  

Start 7/2/17 at 09:00


Docusate Sodium (Colace) 100 mg BID PO  Last administered on 7/2/17at 08:58;  

Start 6/29/17 at 21:00


Enoxaparin Sodium 30 mg 30 mg Q24H SQ  Last administered on 7/2/17at 08:56;  

Start 7/1/17 at 09:00


Ergocalciferol (Drisdol) 50,000 units ONCE  ONCE PO ;  Start 6/30/17 at 10:00;  

Stop 6/30/17 at 10:01;  Status DC


Famotidine (Pepcid Inj) 20 mg Q12HR IV PUSH  Last administered on 7/2/17at 08:57

;  Start 6/30/17 at 13:30


Glucagon (Glucagon Inj) 1 mg UNSCH  PRN OTHER HYPOGLYCEMIA-SEE COMMENTS;  Start 

6/30/17 at 13:30


Insulin Aspart (NovoLOG SUPPLEMENTAL SCALE) 1 ACHS SLIDING  SCALE SQ  Last 

administered on 6/30/17at 20:40;  Start 6/30/17 at 16:00


Insulin Human Regular (NovoLIN R INJ) See Protocol Table ... ON CALL  PRN SQ 

SEE PROTOCOL TABLE;  Start 6/29/17 at 22:00;  Stop 7/2/17 at 21:59


IV Flush (NS Flush) 2 ml BID IVF  Last administered on 7/2/17at 08:58;  Start 6/ 30/17 at 21:00


Ketamine HCl 5 mg 5 mg ONCE  ONCE IV PUSH  Last administered on 6/29/17at 14:43

;  Start 6/29/17 at 14:45;  Stop 6/29/17 at 14:46;  Status DC


Lactated Ringer's 1,000 ml @  30 mls/hr Q24H PRN IV SEE LABEL COMMENTS;  Start 6 /29/17 at 22:00;  Stop 7/2/17 at 21:59


Metoprolol Tartrate (Lopressor) 25 mg ON CALL  PRN PO SEE LABEL COMMENTS;  

Start 6/29/17 at 22:00;  Stop 7/2/17 at 21:59


Miscellaneous Information ALL NURSING DEPARTME... UNSCH  PRN .XX SEE LABEL 

COMMENTS;  Start 6/30/17 at 09:46;  Stop 7/1/17 at 09:45;  Status DC


Morphine Sulfate (Morphine Inj) 3 mg Q3H  PRN IV PUSH break thru pain Last 

administered on 7/1/17at 22:39;  Start 6/30/17 at 09:45


Naloxone HCl 0.4 mg 0.4 mg UNSCH  PRN IV RESPIRATORY RATE LESS THAN 10;  Start 6 /29/17 at 16:15


Ondansetron HCl (Zofran Inj) 4 mg Q6H  PRN IVP NAUSEA OR VOMITING;  Start 6/29/ 17 at 16:15


Pneumococcal Polyvalent Vaccine (Pneumovax-23 Inj) 25 mcg ONCE ONCE IM ;  Start 

6/30/17 at 10:00;  Stop 6/30/17 at 10:00;  Status DC


Povidone Iodine (Betadine 5% Antisepsis Kit) 1 applic ON CALL  PRN EACH NARE 

SEE LABEL COMMENTS;  Start 6/29/17 at 22:00;  Stop 7/2/17 at 21:59


Sodium Chloride (NS 1000 ml Inj) 1,000 ml @  100 mls/hr Q10H IV  Last 

administered on 7/2/17at 05:36;  Start 6/29/17 at 16:12


Sodium Chloride ( ml Inj) 500 ml @  30 mls/hr [id number] PRN IV SEE LABEL 

COMMENTS;  Start 6/29/17 at 22:00;  Stop 7/2/17 at 21:59


Sodium Chloride (NS Flush) 2 ml BID IV FLUSH ;  Start 6/29/17 at 21:00;  Stop 6/ 30/17 at 09:57;  Status DC





Assessment and Plan


Problem List:  


(1) Fracture, proximal femur


Status:  Acute


(2) Anemia


Status:  Acute


Plan:  type cross transfuse  2 units prbc





Assessment and Plan


post op   will probably require a sitter once awake





Problem Qualifiers





(1) Fracture, proximal femur:  


Qualified Code:  S72.001A - Fracture, proximal femur, right, closed, initial 

encounter


(2) Anemia:  





Tank Santacruz DO Jul 2, 2017 11:43

## 2017-07-02 NOTE — PD.ORT.PN
Subjective


Subjective Remarks


Patient appears comfortable. History of dementia. Care taker at bedside. RN 

denies any tachycardia episodes. Patient on digoxin.





Objective


Vitals





 Vital Signs








  Date Time  Temp Pulse Resp B/P Pulse Ox O2 Delivery O2 Flow Rate FiO2


 


7/2/17 08:30  67      


 


7/2/17 08:30 98.8 73 14 146/72 98   


 


7/2/17 06:01  61      


 


7/2/17 05:05  74      


 


7/2/17 04:30 98.4 87 16 149/59 97   


 


7/2/17 04:30  71      


 


7/2/17 03:07  61      


 


7/2/17 02:10  63      


 


7/2/17 01:03  63      


 


7/2/17 00:05 98.7 62 18 120/60 98   


 


7/2/17 00:05  61      


 


7/1/17 23:35  63      


 


7/1/17 22:56  76      


 


7/1/17 21:00  70      


 


7/1/17 20:25 98.8 77 16 117/65 99   


 


7/1/17 20:00  68      


 


7/1/17 19:00  62      


 


7/1/17 18:00  63      


 


7/1/17 17:00  62      


 


7/1/17 16:00 97.3 63 16 100/54 100   


 


7/1/17 15:00  62      


 


7/1/17 14:25  96      


 


7/1/17 14:00  181      


 


7/1/17 13:00  175      


 


7/1/17 12:45 98.0 181 22 130/87 100   


 


7/1/17 12:45  181      


 


7/1/17 11:55  178 18 117/78 100   


 


7/1/17 11:50 96.6 77 20 113/57 100   








 I/O








 7/1/17 7/1/17 7/1/17 7/2/17 7/2/17 7/2/17





 07:00 15:00 23:00 07:00 15:00 23:00


 


Intake Total 640 ml  650 ml 1473 ml  


 


Output Total 250 ml  300 ml 450 ml  


 


Balance 390 ml  350 ml 1023 ml  


 


      


 


Intake Oral   0 ml 0 ml  


 


IV Total 640 ml  350 ml 1473 ml  


 


Packed Cells   300 ml   


 


Output Urine Total 250 ml  300 ml 450 ml  


 


# Bowel Movements 0   0  








Result Diagram:  


7/2/17 0526                                                                    

            7/1/17 2334





Imaging





Last 72 hours Impressions








Hip and Pelvis X-Ray 6/29/17 0000 Signed





Impressions: 





 Service Date/Time:  Thursday, June 29, 2017 15:09 - CONCLUSION:  Comminuted 4 





 fragment intertrochanteric fracture of the right hip  Intact left hip and AP 





 pelvis.     Fletcher Medina MD 


 


Head CT 6/29/17 0000 Signed





Impressions: 





 Service Date/Time:  Thursday, June 29, 2017 15:23 - CONCLUSION:  1. Severe 





 diffuse cerebral atrophy.  2. Moderate periventricular and subcortical white 





 matter small vessels changes bilaterally.  3. No acute infarct, acute 





 hemorrhage, mass effect or extra-axial fluid collections.    Tomas Bang MD 


 


Femur X-Ray 6/29/17 0000 Signed





Impressions: 





 Service Date/Time:  Thursday, June 29, 2017 15:04 - CONCLUSION:  Proximal 

right 





 femur fracture     Anatoliy Reyna MD 


 


Chest X-Ray 6/29/17 0000 Signed





Impressions: 





 Service Date/Time:  Thursday, June 29, 2017 15:23 - CONCLUSION:   1.  No acute 





 cardiopulmonary disease.   2.  Degenerative changes and scoliosis of the 





 thoracolumbar spine.     Tomas Bang MD 


 


Cervical Spine CT 6/29/17 0000 Signed





Impressions: 





 Service Date/Time:  Thursday, June 29, 2017 15:23 - CONCLUSION:  1.  No acute 





 fracture or prevertebral soft tissue swelling. 2.  Moderate spinal stenosis 

and 





 bilateral foraminal narrowing at C4-5.  3.  Moderate bilateral foraminal 





 narrowing but no spinal stenosis at C3-4, C5-6 and C6-7.  4.  Grade I 





 retrolisthesis of C4 in relation to C3 and C5.  5.  Grade I anterolisthesis of 





 C7 in relation to T1.  6.  Diffuse cervical spondylosis and scoliosis of the 





 cervical spine.    Tomas Bang MD 








Objective Remarks


Right lower extremity:


dressing C/D/I


minimal swelling


calves soft





Assessment & Plan


Assessment and Plan


POD # 2 Reduction and intramedullary nail fixation right femur 

intertrochanteric and subtrochanteric fractures


Pain management - Norco


DVT prophylaxis - Lovenox


Physical therapy - 50% weight bearing


Initiate daily dressing changes on POD 2


D/C planning - anticipating SNF


Monitor








Elvin Bueno Jul 2, 2017 10:57

## 2017-07-03 VITALS
SYSTOLIC BLOOD PRESSURE: 138 MMHG | RESPIRATION RATE: 16 BRPM | TEMPERATURE: 97.1 F | OXYGEN SATURATION: 95 % | DIASTOLIC BLOOD PRESSURE: 59 MMHG | HEART RATE: 58 BPM

## 2017-07-03 VITALS
HEART RATE: 56 BPM | OXYGEN SATURATION: 95 % | RESPIRATION RATE: 18 BRPM | SYSTOLIC BLOOD PRESSURE: 174 MMHG | DIASTOLIC BLOOD PRESSURE: 82 MMHG | TEMPERATURE: 96.3 F

## 2017-07-03 VITALS
TEMPERATURE: 96.5 F | OXYGEN SATURATION: 96 % | DIASTOLIC BLOOD PRESSURE: 79 MMHG | HEART RATE: 83 BPM | SYSTOLIC BLOOD PRESSURE: 138 MMHG | RESPIRATION RATE: 18 BRPM

## 2017-07-03 VITALS — HEART RATE: 79 BPM

## 2017-07-03 VITALS — HEART RATE: 77 BPM | TEMPERATURE: 97.2 F | RESPIRATION RATE: 17 BRPM | OXYGEN SATURATION: 95 %

## 2017-07-03 VITALS — SYSTOLIC BLOOD PRESSURE: 194 MMHG | DIASTOLIC BLOOD PRESSURE: 96 MMHG

## 2017-07-03 VITALS
TEMPERATURE: 97 F | OXYGEN SATURATION: 94 % | HEART RATE: 85 BPM | RESPIRATION RATE: 19 BRPM | SYSTOLIC BLOOD PRESSURE: 165 MMHG | DIASTOLIC BLOOD PRESSURE: 74 MMHG

## 2017-07-03 VITALS — HEART RATE: 75 BPM

## 2017-07-03 VITALS — HEART RATE: 64 BPM

## 2017-07-03 LAB
ANION GAP SERPL CALC-SCNC: 7 MEQ/L (ref 5–15)
BASOPHILS # BLD AUTO: 0 TH/MM3 (ref 0–0.2)
BASOPHILS NFR BLD: 0.3 % (ref 0–2)
BUN SERPL-MCNC: 8 MG/DL (ref 7–18)
CHLORIDE SERPL-SCNC: 102 MEQ/L (ref 98–107)
EOSINOPHIL # BLD: 0.2 TH/MM3 (ref 0–0.4)
EOSINOPHIL NFR BLD: 2.3 % (ref 0–4)
ERYTHROCYTE [DISTWIDTH] IN BLOOD BY AUTOMATED COUNT: 13.5 % (ref 11.6–17.2)
GFR SERPLBLD BASED ON 1.73 SQ M-ARVRAT: 177 ML/MIN (ref 89–?)
HCO3 BLD-SCNC: 29.3 MEQ/L (ref 21–32)
HCT VFR BLD CALC: 34.6 % (ref 35–46)
HEMO FLAGS: (no result)
LYMPHOCYTES # BLD AUTO: 1.7 TH/MM3 (ref 1–4.8)
LYMPHOCYTES NFR BLD AUTO: 21.4 % (ref 9–44)
MCH RBC QN AUTO: 28.9 PG (ref 27–34)
MCHC RBC AUTO-ENTMCNC: 33.4 % (ref 32–36)
MCV RBC AUTO: 86.4 FL (ref 80–100)
MONOCYTES NFR BLD: 9.7 % (ref 0–8)
NEUTROPHILS # BLD AUTO: 5.4 TH/MM3 (ref 1.8–7.7)
NEUTROPHILS NFR BLD AUTO: 66.3 % (ref 16–70)
PLATELET # BLD: 206 TH/MM3 (ref 150–450)
POTASSIUM SERPL-SCNC: 3 MEQ/L (ref 3.5–5.1)
RBC # BLD AUTO: 4 MIL/MM3 (ref 4–5.3)
SODIUM SERPL-SCNC: 138 MEQ/L (ref 136–145)
WBC # BLD AUTO: 8.2 TH/MM3 (ref 4–11)

## 2017-07-03 RX ADMIN — FAMOTIDINE SCH MG: 10 INJECTION, SOLUTION INTRAVENOUS at 08:28

## 2017-07-03 RX ADMIN — FAMOTIDINE SCH MG: 10 INJECTION, SOLUTION INTRAVENOUS at 20:48

## 2017-07-03 RX ADMIN — SODIUM CHLORIDE SCH MLS/HR: 900 INJECTION INTRAVENOUS at 16:49

## 2017-07-03 RX ADMIN — PHENYTOIN SODIUM SCH MLS/HR: 50 INJECTION INTRAMUSCULAR; INTRAVENOUS at 08:32

## 2017-07-03 RX ADMIN — HYDROCODONE BITARTRATE AND ACETAMINOPHEN PRN TAB: 5; 325 TABLET ORAL at 06:09

## 2017-07-03 RX ADMIN — CALCIUM CARBONATE-CHOLECALCIFEROL TAB 250 MG-125 UNIT SCH MG: 250-125 TAB at 10:55

## 2017-07-03 RX ADMIN — SODIUM CHLORIDE, PRESERVATIVE FREE SCH ML: 5 INJECTION INTRAVENOUS at 08:28

## 2017-07-03 RX ADMIN — HYDROCODONE BITARTRATE AND ACETAMINOPHEN PRN TAB: 5; 325 TABLET ORAL at 10:55

## 2017-07-03 RX ADMIN — DIGOXIN SCH MG: 125 TABLET ORAL at 08:28

## 2017-07-03 RX ADMIN — BISACODYL SCH MG: 5 TABLET, COATED ORAL at 10:30

## 2017-07-03 RX ADMIN — DOCUSATE SODIUM SCH MG: 100 CAPSULE, LIQUID FILLED ORAL at 08:28

## 2017-07-03 RX ADMIN — ENOXAPARIN SODIUM SCH MG: 30 INJECTION SUBCUTANEOUS at 08:28

## 2017-07-03 RX ADMIN — INSULIN ASPART SCH: 100 INJECTION, SOLUTION INTRAVENOUS; SUBCUTANEOUS at 10:54

## 2017-07-03 RX ADMIN — PHENYTOIN SODIUM SCH MLS/HR: 50 INJECTION INTRAMUSCULAR; INTRAVENOUS at 20:48

## 2017-07-03 RX ADMIN — MAGNESIUM HYDROXIDE SCH ML: 400 SUSPENSION ORAL at 20:47

## 2017-07-03 RX ADMIN — SODIUM CHLORIDE, PRESERVATIVE FREE SCH ML: 5 INJECTION INTRAVENOUS at 20:48

## 2017-07-03 RX ADMIN — INSULIN ASPART SCH: 100 INJECTION, SOLUTION INTRAVENOUS; SUBCUTANEOUS at 16:00

## 2017-07-03 RX ADMIN — PHENYTOIN SODIUM SCH MLS/HR: 50 INJECTION INTRAMUSCULAR; INTRAVENOUS at 21:52

## 2017-07-03 RX ADMIN — INSULIN ASPART SCH: 100 INJECTION, SOLUTION INTRAVENOUS; SUBCUTANEOUS at 20:48

## 2017-07-03 RX ADMIN — HYDROCODONE BITARTRATE AND ACETAMINOPHEN PRN TAB: 5; 325 TABLET ORAL at 20:59

## 2017-07-03 RX ADMIN — DOCUSATE SODIUM SCH MG: 100 CAPSULE, LIQUID FILLED ORAL at 20:47

## 2017-07-03 RX ADMIN — PHENYTOIN SODIUM SCH MLS/HR: 50 INJECTION INTRAMUSCULAR; INTRAVENOUS at 00:43

## 2017-07-03 RX ADMIN — MAGNESIUM HYDROXIDE SCH ML: 400 SUSPENSION ORAL at 10:55

## 2017-07-03 RX ADMIN — BISACODYL SCH MG: 5 TABLET, COATED ORAL at 20:48

## 2017-07-03 RX ADMIN — CALCIUM CARBONATE-CHOLECALCIFEROL TAB 250 MG-125 UNIT SCH MG: 250-125 TAB at 16:50

## 2017-07-03 RX ADMIN — CHOLECALCIFEROL CAP 125 MCG (5000 UNIT) SCH UNITS: 125 CAP at 08:28

## 2017-07-03 RX ADMIN — CALCIUM CARBONATE-CHOLECALCIFEROL TAB 250 MG-125 UNIT SCH MG: 250-125 TAB at 08:28

## 2017-07-03 RX ADMIN — INSULIN ASPART SCH: 100 INJECTION, SOLUTION INTRAVENOUS; SUBCUTANEOUS at 06:23

## 2017-07-03 NOTE — PD.ORT.PN
Subjective


Subjective Remarks


No new complaints. Dementia





Objective


Vitals





 Vital Signs








  Date Time  Temp Pulse Resp B/P Pulse Ox O2 Delivery O2 Flow Rate FiO2


 


7/3/17 04:03 97.1 58 16 138/59 95   


 


7/2/17 23:40 97.0 77 17 128/56 94   


 


7/2/17 22:59  63      


 


7/2/17 19:40 97.8 79 16 145/77 95   


 


7/2/17 18:40      Room Air  


 


7/2/17 17:00  80      


 


7/2/17 16:00 98.6 67 20 141/67 95   


 


7/2/17 16:00  84      


 


7/2/17 15:00  71      


 


7/2/17 14:00  72      


 


7/2/17 13:00  80      


 


7/2/17 12:00  78      


 


7/2/17 12:00 98.5 78 16 153/74 95   


 


7/2/17 11:00  74      


 


7/2/17 10:00  78      


 


7/2/17 09:00  74      


 


7/2/17 08:30  67      


 


7/2/17 08:30 98.8 73 14 146/72 98   








 I/O








 7/2/17 7/2/17 7/2/17 7/3/17 7/3/17 7/3/17





 07:00 15:00 23:00 07:00 15:00 23:00


 


Intake Total 1473 ml  2139 ml   


 


Output Total 450 ml  400 ml   


 


Balance 1023 ml  1739 ml   


 


      


 


Intake Oral 0 ml  420 ml   


 


IV Total 1473 ml  1719 ml   


 


Output Urine Total 450 ml  400 ml   


 


# Voids   4   


 


# Bowel Movements 0  0   








Result Diagram:  


7/2/17 0526                                                                    

            7/1/17 2334





Imaging





Last 72 hours Impressions








Hip and Pelvis X-Ray 6/29/17 0000 Signed





Impressions: 





 Service Date/Time:  Thursday, June 29, 2017 15:09 - CONCLUSION:  Comminuted 4 





 fragment intertrochanteric fracture of the right hip  Intact left hip and AP 





 pelvis.     Fletcher Medina MD 


 


Head CT 6/29/17 0000 Signed





Impressions: 





 Service Date/Time:  Thursday, June 29, 2017 15:23 - CONCLUSION:  1. Severe 





 diffuse cerebral atrophy.  2. Moderate periventricular and subcortical white 





 matter small vessels changes bilaterally.  3. No acute infarct, acute 





 hemorrhage, mass effect or extra-axial fluid collections.    Tomas Bang MD 


 


Femur X-Ray 6/29/17 0000 Signed





Impressions: 





 Service Date/Time:  Thursday, June 29, 2017 15:04 - CONCLUSION:  Proximal 

right 





 femur fracture     Anatoliy Reyna MD 


 


Chest X-Ray 6/29/17 0000 Signed





Impressions: 





 Service Date/Time:  Thursday, June 29, 2017 15:23 - CONCLUSION:   1.  No acute 





 cardiopulmonary disease.   2.  Degenerative changes and scoliosis of the 





 thoracolumbar spine.     Tomas Bang MD 


 


Cervical Spine CT 6/29/17 0000 Signed





Impressions: 





 Service Date/Time:  Thursday, June 29, 2017 15:23 - CONCLUSION:  1.  No acute 





 fracture or prevertebral soft tissue swelling. 2.  Moderate spinal stenosis 

and 





 bilateral foraminal narrowing at C4-5.  3.  Moderate bilateral foraminal 





 narrowing but no spinal stenosis at C3-4, C5-6 and C6-7.  4.  Grade I 





 retrolisthesis of C4 in relation to C3 and C5.  5.  Grade I anterolisthesis of 





 C7 in relation to T1.  6.  Diffuse cervical spondylosis and scoliosis of the 





 cervical spine.    Tomas Bang MD 








Objective Remarks


Right lower extremity: Clean dry dressings intact. She has mild swelling. Good 

capillary refills in distal pulses





Assessment & Plan


Assessment and Plan


POD # 3 Reduction and intramedullary nail fixation right femur 

intertrochanteric and subtrochanteric fractures


Pain management - Norco


DVT prophylaxis - Lovenox


Physical therapy - 50% weight bearing


daily dressing changes 


D/C planning - anticipating SNF when bed available. Orthopedic cleared


Follow-up appointment with Dr. Tran or PA in 2 weeks








Gerber Calloway Jr. Jul 3, 2017 06:42

## 2017-07-03 NOTE — HHI.PR
Subjective


Remarks


hr stabilized  now returned to ortho floor





Objective





 Vital Signs








  Date Time  Temp Pulse Resp B/P Pulse Ox O2 Delivery O2 Flow Rate FiO2


 


7/3/17 15:43  75      


 


7/3/17 11:44    194/96    


 


7/3/17 11:03 97.2 77 17  95   


 


7/3/17 08:35      Room Air  


 


7/3/17 07:34  64      


 


7/3/17 07:25 96.3 56 18 174/82 95   


 


7/3/17 04:03 97.1 58 16 138/59 95   


 


7/2/17 23:40 97.0 77 17 128/56 94   


 


7/2/17 22:59  63      


 


7/2/17 19:40 97.8 79 16 145/77 95   


 


7/2/17 18:40      Room Air  


 


7/2/17 17:00  80      


 


7/2/17 16:00 98.6 67 20 141/67 95   


 


7/2/17 16:00  84      








 I/O








 7/2/17 7/2/17 7/2/17 7/3/17 7/3/17 7/3/17





 07:00 15:00 23:00 07:00 15:00 23:00


 


Intake Total 1473 ml  2139 ml  1101 ml 


 


Output Total 450 ml  400 ml   


 


Balance 1023 ml  1739 ml  1101 ml 


 


      


 


Intake Oral 0 ml  420 ml  60 ml 


 


IV Total 1473 ml  1719 ml  1041 ml 


 


Output Urine Total 450 ml  400 ml   


 


# Voids   4  4 


 


# Bowel Movements 0  0  0 








Result Diagram:  


7/3/17 0644                                                                    

            7/3/17 0644





Procedures


orif hip


Objective Remarks


GENERAL: somnolent


SKIN: Warm and dry.


HEAD: Atraumatic. Normocephalic. 


EYES: Pupils equal and round. No scleral icterus. No injection or drainage. 


ENT: No nasal bleeding or discharge.  Mucous membranes pink and moist.


NECK: Trachea midline. No JVD. 


CARDIOVASCULAR: Regular rate and rhythm.  


RESPIRATORY: No accessory muscle use. Clear to auscultation. Breath sounds 

equal bilaterally. 


GASTROINTESTINAL: Abdomen soft, non-tender, nondistended. Hepatic and splenic 

margins not palpable. 


MUSCULOSKELETAL: Extremities without clubbing, cyanosis, or edema. No obvious 

deformities. hip incision stable  


NEUROLOGICAL: asleep post op No obvious cranial nerve deficits.  Motor grossly 

within normal limits.   end stage alzheimers


PSYCHIATRIC:confused


Medications and IVs





 Inpatient Medications


Acetaminophen (Tylenol) 650 mg Q4H  PRN PO FEVER;  Start 6/29/17 at 16:15


Acetaminophen/ Hydrocodone Bitart (Norco  5-325 Mg) 1 tab Q4H  PRN PO PAIN 1-10 

Last administered on 7/3/17at 10:55;  Start 6/30/17 at 00:15


Amlodipine Besylate (Norvasc) 5 mg ONCE  ONCE PO ;  Start 7/3/17 at 15:00;  

Stop 7/3/17 at 15:01;  Status DC


Bisacodyl (Dulcolax Ec) 10 mg BID PO  Last administered on 7/3/17at 10:30;  

Start 7/3/17 at 10:30


Bisacodyl (Dulcolax Supp) 10 mg ONCE  STAT RECTAL  Last administered on 7/3/

17at 10:55;  Start 7/3/17 at 10:27;  Stop 7/3/17 at 10:28;  Status DC


Calcium/Vitamin D (Oscal-D 250-125) 250 mg TID PO  Last administered on 7/3/

17at 10:55;  Start 6/30/17 at 13:00


Cefazolin Sodium/ Sodium Chloride (Ancef Inj/NS Inj) 100 ml @  200 mls/hr Q8H 

IV ;  Start 6/30/17 at 09:45;  Stop 6/30/17 at 10:18;  Status DC


Ceftriaxone Sodium 1000 mg/ Sodium Chloride 100 ml @  200 mls/hr Q24H IV  Last 

administered on 7/2/17at 17:21;  Start 6/29/17 at 18:00


Chlorhexidine Gluconate (Chlorhexidine 2% Cloth) 3 pack ON CALL  PRN TOPICAL 

SEE LABEL COMMENTS;  Start 6/29/17 at 22:00;  Stop 7/2/17 at 21:59;  Status DC


Cholecalciferol (Vitamin D3) 5,000 units DAILY PO  Last administered on 7/3/

17at 08:28;  Start 7/1/17 at 09:00


Dextrose (D50w (Vial) Inj) 50 ml UNSCH  PRN IV HYPOGLYCEMIA-SEE COMMENTS;  

Start 6/30/17 at 13:30


Digoxin (Lanoxin Inj) 0.125 mg DAILY  PRN IV PUSH SEE LABEL COMMENTS;  Start 7/1 /17 at 19:45


Digoxin (Lanoxin) 0.125 mg DAILY PO  Last administered on 7/3/17at 08:28;  

Start 7/2/17 at 09:00


Docusate Sodium (Colace) 100 mg BID PO  Last administered on 7/3/17at 08:28;  

Start 6/29/17 at 21:00


Enoxaparin Sodium 30 mg 30 mg Q24H SQ  Last administered on 7/3/17at 08:28;  

Start 7/1/17 at 09:00


Ergocalciferol (Drisdol) 50,000 units ONCE  ONCE PO ;  Start 6/30/17 at 10:00;  

Stop 6/30/17 at 10:01;  Status DC


Famotidine (Pepcid Inj) 20 mg Q12HR IV PUSH  Last administered on 7/3/17at 08:28

;  Start 6/30/17 at 13:30


Glucagon (Glucagon Inj) 1 mg UNSCH  PRN OTHER HYPOGLYCEMIA-SEE COMMENTS;  Start 

6/30/17 at 13:30


Insulin Aspart (NovoLOG SUPPLEMENTAL SCALE) 1 ACHS SLIDING  SCALE SQ  Last 

administered on 6/30/17at 20:40;  Start 6/30/17 at 16:00


Insulin Human Regular (NovoLIN R INJ) See Protocol Table ... ON CALL  PRN SQ 

SEE PROTOCOL TABLE;  Start 6/29/17 at 22:00;  Stop 7/2/17 at 21:59;  Status DC


IV Flush (NS Flush) 2 ml BID IVF  Last administered on 7/3/17at 08:28;  Start 6/ 30/17 at 21:00


Ketamine HCl 5 mg 5 mg ONCE  ONCE IV PUSH  Last administered on 6/29/17at 14:43

;  Start 6/29/17 at 14:45;  Stop 6/29/17 at 14:46;  Status DC


Lactated Ringer's 1,000 ml @  30 mls/hr Q24H PRN IV SEE LABEL COMMENTS;  Start 6 /29/17 at 22:00;  Stop 7/2/17 at 21:59;  Status DC


Magnesium Hydroxide (Milk Of Magnesia Liq) 30 ml BID PO  Last administered on 7/

3/17at 10:55;  Start 7/3/17 at 10:30


Metoprolol Tartrate (Lopressor) 25 mg ON CALL  PRN PO SEE LABEL COMMENTS;  

Start 6/29/17 at 22:00;  Stop 7/2/17 at 21:59;  Status DC


Miscellaneous Information ALL NURSING DEPARTME... UNSCH  PRN .XX SEE LABEL 

COMMENTS;  Start 6/30/17 at 09:46;  Stop 7/1/17 at 09:45;  Status DC


Morphine Sulfate (Morphine Inj) 3 mg Q3H  PRN IV PUSH break thru pain Last 

administered on 7/1/17at 22:39;  Start 6/30/17 at 09:45


Naloxone HCl 0.4 mg 0.4 mg UNSCH  PRN IV RESPIRATORY RATE LESS THAN 10;  Start 6 /29/17 at 16:15


Ondansetron HCl (Zofran Inj) 4 mg Q6H  PRN IVP NAUSEA OR VOMITING;  Start 6/29/ 17 at 16:15


Pneumococcal Polyvalent Vaccine (Pneumovax-23 Inj) 25 mcg ONCE ONCE IM ;  Start 

6/30/17 at 10:00;  Stop 6/30/17 at 10:00;  Status DC


Povidone Iodine (Betadine 5% Antisepsis Kit) 1 applic ON CALL  PRN EACH NARE 

SEE LABEL COMMENTS;  Start 6/29/17 at 22:00;  Stop 7/2/17 at 21:59;  Status DC


Sodium Chloride (NS 1000 ml Inj) 1,000 ml @  100 mls/hr Q10H IV  Last 

administered on 7/3/17at 00:43;  Start 6/29/17 at 16:12


Sodium Chloride ( ml Inj) 500 ml @  30 mls/hr [id number] PRN IV SEE LABEL 

COMMENTS;  Start 6/29/17 at 22:00;  Stop 7/2/17 at 21:59;  Status DC


Sodium Chloride (NS Flush) 2 ml BID IV FLUSH ;  Start 6/29/17 at 21:00;  Stop 6/ 30/17 at 09:57;  Status DC





Assessment and Plan


Problem List:  


(1) Fracture, proximal femur


Status:  Acute


(2) Anemia


Status:  Acute


Plan:  type cross transfuse  2 units prbc





Assessment and Plan


post op   will probably require a sitter once awake


Discharge Planning


SNF





Problem Qualifiers





(1) Fracture, proximal femur:  


Qualified Code:  S72.001A - Fracture, proximal femur, right, closed, initial 

encounter


(2) Anemia:  





Tank Santacruz DO Jul 3, 2017 15:54

## 2017-07-03 NOTE — PD.CARD.PN
Subjective


Subjective Remarks


Patient is severely demented. Per the patient's caregiver, she seems to be at 

her baseline.





Objective


Vital Signs / I&O





 Vital Signs








  Date Time  Temp Pulse Resp B/P Pulse Ox O2 Delivery O2 Flow Rate FiO2


 


7/3/17 11:44    194/96    


 


7/3/17 11:03 97.2 77 17  95   


 


7/3/17 08:35      Room Air  


 


7/3/17 07:34  64      


 


7/3/17 07:25 96.3 56 18 174/82 95   


 


7/3/17 04:03 97.1 58 16 138/59 95   


 


7/2/17 23:40 97.0 77 17 128/56 94   


 


7/2/17 22:59  63      


 


7/2/17 19:40 97.8 79 16 145/77 95   


 


7/2/17 18:40      Room Air  


 


7/2/17 17:00  80      


 


7/2/17 16:00 98.6 67 20 141/67 95   


 


7/2/17 16:00  84      


 


7/2/17 15:00  71      








 I/O








 7/2/17 7/2/17 7/2/17 7/3/17 7/3/17 7/3/17





 07:00 15:00 23:00 07:00 15:00 23:00


 


Intake Total 1473 ml  2139 ml  1101 ml 


 


Output Total 450 ml  400 ml   


 


Balance 1023 ml  1739 ml  1101 ml 


 


      


 


Intake Oral 0 ml  420 ml  60 ml 


 


IV Total 1473 ml  1719 ml  1041 ml 


 


Output Urine Total 450 ml  400 ml   


 


# Voids   4  4 


 


# Bowel Movements 0  0  0 








Physical Exam


GENERAL: Elderly female, non verbal, caregiver at bedside


SKIN: Warm and dry.


HEAD: Normocephalic.


EYES: No scleral icterus. No injection or drainage. 


NECK: Supple, trachea midline. 


CARDIOVASCULAR: Regular rate and rhythm 


RESPIRATORY: Breath sounds equal bilaterally. No accessory muscle use.


GASTROINTESTINAL: Abdomen soft, non-tender, nondistended. 


MUSCULOSKELETAL: No cyanosis, or edema. 


BACK: Nontender without obvious deformity. No CVA tenderness.





Laboratory





Laboratory Tests








Test 7/3/17





 06:44


 


White Blood Count 8.2 TH/MM3


 


Red Blood Count 4.00 MIL/MM3


 


Hemoglobin 11.6 GM/DL


 


Hematocrit 34.6 %


 


Mean Corpuscular Volume 86.4 FL


 


Mean Corpuscular Hemoglobin 28.9 PG


 


Mean Corpuscular Hemoglobin 33.4 %





Concent 


 


Red Cell Distribution Width 13.5 %


 


Platelet Count 206 TH/MM3


 


Mean Platelet Volume 10.1 FL


 


Neutrophils (%) (Auto) 66.3 %


 


Lymphocytes (%) (Auto) 21.4 %


 


Monocytes (%) (Auto) 9.7 %


 


Eosinophils (%) (Auto) 2.3 %


 


Basophils (%) (Auto) 0.3 %


 


Neutrophils # (Auto) 5.4 TH/MM3


 


Lymphocytes # (Auto) 1.7 TH/MM3


 


Monocytes # (Auto) 0.8 TH/MM3


 


Eosinophils # (Auto) 0.2 TH/MM3


 


Basophils # (Auto) 0.0 TH/MM3


 


CBC Comment DIFF FINAL 


 


Differential Comment  


 


Sodium Level 138 MEQ/L


 


Potassium Level 3.0 MEQ/L


 


Chloride Level 102 MEQ/L


 


Carbon Dioxide Level 29.3 MEQ/L


 


Anion Gap 7 MEQ/L


 


Blood Urea Nitrogen 8 MG/DL


 


Creatinine 0.35 MG/DL


 


Estimat Glomerular Filtration 177 ML/MIN





Rate 


 


Random Glucose 113 MG/DL


 


Calcium Level 8.3 MG/DL











Assessment and Plan


Assessment and Plan


ASSESSMENT


1. Post operative supraventricular tachycardia. Converted with carotid massage. 


2. Right hip fracture, no immediate postop open reduction internal fixation of


     A proximal femoral fracture.


3. Advanced age.


4. Advanced dementia.


5. Profound anemia, postoperative bleeding. H/H improving 


6. History of CAD





PLAN:


The patient is known to Dr. Ramirez. 


Will give amlodipine 5 mg for hypertension now


The patient was seen and evaluated by Irene Allen Jul 3, 2017 14:17

## 2017-07-04 VITALS
OXYGEN SATURATION: 98 % | RESPIRATION RATE: 19 BRPM | SYSTOLIC BLOOD PRESSURE: 173 MMHG | DIASTOLIC BLOOD PRESSURE: 78 MMHG | HEART RATE: 81 BPM | TEMPERATURE: 97.4 F

## 2017-07-04 VITALS
OXYGEN SATURATION: 98 % | RESPIRATION RATE: 17 BRPM | SYSTOLIC BLOOD PRESSURE: 133 MMHG | TEMPERATURE: 96.4 F | DIASTOLIC BLOOD PRESSURE: 68 MMHG | HEART RATE: 61 BPM

## 2017-07-04 VITALS
TEMPERATURE: 97.6 F | DIASTOLIC BLOOD PRESSURE: 82 MMHG | RESPIRATION RATE: 16 BRPM | SYSTOLIC BLOOD PRESSURE: 157 MMHG | OXYGEN SATURATION: 94 % | HEART RATE: 65 BPM

## 2017-07-04 VITALS
SYSTOLIC BLOOD PRESSURE: 164 MMHG | HEART RATE: 59 BPM | OXYGEN SATURATION: 96 % | DIASTOLIC BLOOD PRESSURE: 74 MMHG | TEMPERATURE: 96.3 F | RESPIRATION RATE: 17 BRPM

## 2017-07-04 VITALS
SYSTOLIC BLOOD PRESSURE: 132 MMHG | DIASTOLIC BLOOD PRESSURE: 66 MMHG | HEART RATE: 57 BPM | TEMPERATURE: 96.7 F | RESPIRATION RATE: 16 BRPM | OXYGEN SATURATION: 100 %

## 2017-07-04 VITALS
DIASTOLIC BLOOD PRESSURE: 71 MMHG | TEMPERATURE: 96.9 F | OXYGEN SATURATION: 95 % | RESPIRATION RATE: 16 BRPM | HEART RATE: 71 BPM | SYSTOLIC BLOOD PRESSURE: 148 MMHG

## 2017-07-04 VITALS — HEART RATE: 69 BPM

## 2017-07-04 RX ADMIN — DOCUSATE SODIUM SCH MG: 100 CAPSULE, LIQUID FILLED ORAL at 09:00

## 2017-07-04 RX ADMIN — SODIUM CHLORIDE, PRESERVATIVE FREE SCH ML: 5 INJECTION INTRAVENOUS at 21:18

## 2017-07-04 RX ADMIN — PHENYTOIN SODIUM SCH MLS/HR: 50 INJECTION INTRAMUSCULAR; INTRAVENOUS at 18:17

## 2017-07-04 RX ADMIN — INSULIN ASPART SCH: 100 INJECTION, SOLUTION INTRAVENOUS; SUBCUTANEOUS at 16:00

## 2017-07-04 RX ADMIN — CALCIUM CARBONATE-CHOLECALCIFEROL TAB 250 MG-125 UNIT SCH MG: 250-125 TAB at 10:21

## 2017-07-04 RX ADMIN — DIGOXIN SCH MG: 125 TABLET ORAL at 09:00

## 2017-07-04 RX ADMIN — SODIUM CHLORIDE SCH MLS/HR: 900 INJECTION INTRAVENOUS at 18:16

## 2017-07-04 RX ADMIN — MAGNESIUM HYDROXIDE SCH ML: 400 SUSPENSION ORAL at 21:18

## 2017-07-04 RX ADMIN — FAMOTIDINE SCH MG: 10 INJECTION, SOLUTION INTRAVENOUS at 21:18

## 2017-07-04 RX ADMIN — HYDROCODONE BITARTRATE AND ACETAMINOPHEN PRN TAB: 5; 325 TABLET ORAL at 21:19

## 2017-07-04 RX ADMIN — CALCIUM CARBONATE-CHOLECALCIFEROL TAB 250 MG-125 UNIT SCH MG: 250-125 TAB at 18:16

## 2017-07-04 RX ADMIN — SODIUM CHLORIDE, PRESERVATIVE FREE SCH ML: 5 INJECTION INTRAVENOUS at 09:00

## 2017-07-04 RX ADMIN — INSULIN ASPART SCH: 100 INJECTION, SOLUTION INTRAVENOUS; SUBCUTANEOUS at 06:35

## 2017-07-04 RX ADMIN — CHOLECALCIFEROL CAP 125 MCG (5000 UNIT) SCH UNITS: 125 CAP at 10:21

## 2017-07-04 RX ADMIN — MAGNESIUM HYDROXIDE SCH ML: 400 SUSPENSION ORAL at 10:21

## 2017-07-04 RX ADMIN — INSULIN ASPART SCH: 100 INJECTION, SOLUTION INTRAVENOUS; SUBCUTANEOUS at 21:21

## 2017-07-04 RX ADMIN — CALCIUM CARBONATE-CHOLECALCIFEROL TAB 250 MG-125 UNIT SCH MG: 250-125 TAB at 15:08

## 2017-07-04 RX ADMIN — HYDROCODONE BITARTRATE AND ACETAMINOPHEN PRN TAB: 5; 325 TABLET ORAL at 06:00

## 2017-07-04 RX ADMIN — HYDROCODONE BITARTRATE AND ACETAMINOPHEN PRN TAB: 5; 325 TABLET ORAL at 15:09

## 2017-07-04 RX ADMIN — ENOXAPARIN SODIUM SCH MG: 30 INJECTION SUBCUTANEOUS at 10:21

## 2017-07-04 RX ADMIN — FAMOTIDINE SCH MG: 10 INJECTION, SOLUTION INTRAVENOUS at 10:22

## 2017-07-04 RX ADMIN — BISACODYL SCH MG: 5 TABLET, COATED ORAL at 10:20

## 2017-07-04 RX ADMIN — DOCUSATE SODIUM SCH MG: 100 CAPSULE, LIQUID FILLED ORAL at 21:18

## 2017-07-04 RX ADMIN — BISACODYL SCH MG: 5 TABLET, COATED ORAL at 21:18

## 2017-07-04 RX ADMIN — INSULIN ASPART SCH: 100 INJECTION, SOLUTION INTRAVENOUS; SUBCUTANEOUS at 11:30

## 2017-07-04 NOTE — PD.ORT.PN
Subjective


Subjective Remarks


Patient appears comfortable. History of dementia. Care taker at bedside. NAD.





Objective


Vitals





 Vital Signs








  Date Time  Temp Pulse Resp B/P Pulse Ox O2 Delivery O2 Flow Rate FiO2


 


7/4/17 08:08 96.7 57 16 132/66 100   


 


7/4/17 04:00 97.4 81 19 173/78 98   


 


7/4/17 04:00  79      


 


7/4/17 00:00  61      


 


7/4/17 00:00 96.4 67 17 133/68 98   


 


7/3/17 20:00  79      


 


7/3/17 19:00 96.5 83 18 138/79 96   


 


7/3/17 16:53 97.0 85 19 165/74 94   


 


7/3/17 15:43  75      


 


7/3/17 11:44    194/96    


 


7/3/17 11:03 97.2 77 17  95   








 I/O








 7/3/17 7/3/17 7/3/17 7/4/17 7/4/17 7/4/17





 07:00 15:00 23:00 07:00 15:00 23:00


 


Intake Total  1761 ml 1198 ml 960 ml  


 


Balance  1761 ml 1198 ml 960 ml  


 


      


 


Intake Oral  720 ml 480 ml 50 ml  


 


IV Total  1041 ml 718 ml 910 ml  


 


# Voids  7 4 3  


 


# Bowel Movements  1 0 0  








Result Diagram:  


7/3/17 0644                                                                    

            7/3/17 0644





Imaging





Last 72 hours Impressions








Hip and Pelvis X-Ray 6/29/17 0000 Signed





Impressions: 





 Service Date/Time:  Thursday, June 29, 2017 15:09 - CONCLUSION:  Comminuted 4 





 fragment intertrochanteric fracture of the right hip  Intact left hip and AP 





 pelvis.     Fletcher Medina MD 


 


Head CT 6/29/17 0000 Signed





Impressions: 





 Service Date/Time:  Thursday, June 29, 2017 15:23 - CONCLUSION:  1. Severe 





 diffuse cerebral atrophy.  2. Moderate periventricular and subcortical white 





 matter small vessels changes bilaterally.  3. No acute infarct, acute 





 hemorrhage, mass effect or extra-axial fluid collections.    Tomas Bang MD 


 


Femur X-Ray 6/29/17 0000 Signed





Impressions: 





 Service Date/Time:  Thursday, June 29, 2017 15:04 - CONCLUSION:  Proximal 

right 





 femur fracture     Anatoliy Reyna MD 


 


Chest X-Ray 6/29/17 0000 Signed





Impressions: 





 Service Date/Time:  Thursday, June 29, 2017 15:23 - CONCLUSION:   1.  No acute 





 cardiopulmonary disease.   2.  Degenerative changes and scoliosis of the 





 thoracolumbar spine.     Tomas Bang MD 


 


Cervical Spine CT 6/29/17 0000 Signed





Impressions: 





 Service Date/Time:  Thursday, June 29, 2017 15:23 - CONCLUSION:  1.  No acute 





 fracture or prevertebral soft tissue swelling. 2.  Moderate spinal stenosis 

and 





 bilateral foraminal narrowing at C4-5.  3.  Moderate bilateral foraminal 





 narrowing but no spinal stenosis at C3-4, C5-6 and C6-7.  4.  Grade I 





 retrolisthesis of C4 in relation to C3 and C5.  5.  Grade I anterolisthesis of 





 C7 in relation to T1.  6.  Diffuse cervical spondylosis and scoliosis of the 





 cervical spine.    Tomas Bang MD 








Objective Remarks


Right lower extremity: Clean dry dressings intact. She has mild swelling. Good 

capillary refills in distal pulses





Assessment & Plan


Assessment and Plan


POD # 4 Reduction and intramedullary nail fixation right femur 

intertrochanteric and subtrochanteric fractures


Pain management - Norco


DVT prophylaxis - Lovenox


Physical therapy - 50% weight bearing


daily dressing changes 


D/C planning - anticipating SNF when bed available. Orthopedic cleared.


Follow-up appointment with Dr. Tran or PA in 2 weeks








Elvin Bueno Jul 4, 2017 10:21

## 2017-07-04 NOTE — HHI.PR
Subjective


Remarks


restring quietly awaits placement





Objective





 Vital Signs








  Date Time  Temp Pulse Resp B/P Pulse Ox O2 Delivery O2 Flow Rate FiO2


 


7/4/17 08:08 96.7 57 16 132/66 100   


 


7/4/17 04:00 97.4 81 19 173/78 98   


 


7/4/17 04:00  79      


 


7/4/17 00:00  61      


 


7/4/17 00:00 96.4 67 17 133/68 98   


 


7/3/17 20:00  79      


 


7/3/17 19:00 96.5 83 18 138/79 96   


 


7/3/17 16:53 97.0 85 19 165/74 94   


 


7/3/17 15:43  75      








 I/O








 7/3/17 7/3/17 7/3/17 7/4/17 7/4/17 7/4/17





 07:00 15:00 23:00 07:00 15:00 23:00


 


Intake Total  1761 ml 1198 ml 960 ml  


 


Balance  1761 ml 1198 ml 960 ml  


 


      


 


Intake Oral  720 ml 480 ml 50 ml  


 


IV Total  1041 ml 718 ml 910 ml  


 


# Voids  7 4 3  


 


# Bowel Movements  1 0 0  








Result Diagram:  


7/3/17 0644                                                                    

            7/3/17 0644





Procedures


orif hip


Objective Remarks


GENERAL: somnolent


SKIN: Warm and dry.


HEAD: Atraumatic. Normocephalic. 


EYES: Pupils equal and round. No scleral icterus. No injection or drainage. 


ENT: No nasal bleeding or discharge.  Mucous membranes pink and moist.


NECK: Trachea midline. No JVD. 


CARDIOVASCULAR: Regular rate and rhythm.  


RESPIRATORY: No accessory muscle use. Clear to auscultation. Breath sounds 

equal bilaterally. 


GASTROINTESTINAL: Abdomen soft, non-tender, nondistended. Hepatic and splenic 

margins not palpable. 


MUSCULOSKELETAL: Extremities without clubbing, cyanosis, or edema. No obvious 

deformities. hip incision stable  


NEUROLOGICAL: asleep post op No obvious cranial nerve deficits.  Motor grossly 

within normal limits.   end stage alzheimers


PSYCHIATRIC:confused


Medications and IVs





 Inpatient Medications


Acetaminophen (Tylenol) 650 mg Q4H  PRN PO FEVER;  Start 6/29/17 at 16:15


Acetaminophen/ Hydrocodone Bitart (Norco  5-325 Mg) 1 tab Q4H  PRN PO PAIN 1-10 

Last administered on 7/4/17at 06:00;  Start 6/30/17 at 00:15


Amlodipine Besylate (Norvasc) 5 mg ONCE  ONCE PO  Last administered on 7/3/17at 

16:50;  Start 7/3/17 at 15:00;  Stop 7/3/17 at 15:01;  Status DC


Bisacodyl (Dulcolax Ec) 10 mg BID PO  Last administered on 7/4/17at 10:20;  

Start 7/3/17 at 10:30


Bisacodyl (Dulcolax Supp) 10 mg ONCE  STAT RECTAL  Last administered on 7/3/

17at 10:55;  Start 7/3/17 at 10:27;  Stop 7/3/17 at 10:28;  Status DC


Calcium/Vitamin D (Oscal-D 250-125) 250 mg TID PO  Last administered on 7/4/ 17at 10:21;  Start 6/30/17 at 13:00


Cefazolin Sodium/ Sodium Chloride (Ancef Inj/NS Inj) 100 ml @  200 mls/hr Q8H 

IV ;  Start 6/30/17 at 09:45;  Stop 6/30/17 at 10:18;  Status DC


Ceftriaxone Sodium 1000 mg/ Sodium Chloride 100 ml @  200 mls/hr Q24H IV  Last 

administered on 7/3/17at 16:49;  Start 6/29/17 at 18:00


Chlorhexidine Gluconate (Chlorhexidine 2% Cloth) 3 pack ON CALL  PRN TOPICAL 

SEE LABEL COMMENTS;  Start 6/29/17 at 22:00;  Stop 7/2/17 at 21:59;  Status DC


Cholecalciferol (Vitamin D3) 5,000 units DAILY PO  Last administered on 7/4/ 17at 10:21;  Start 7/1/17 at 09:00


Dextrose (D50w (Vial) Inj) 50 ml UNSCH  PRN IV HYPOGLYCEMIA-SEE COMMENTS;  

Start 6/30/17 at 13:30


Digoxin (Lanoxin Inj) 0.125 mg DAILY  PRN IV PUSH SEE LABEL COMMENTS;  Start 7/1 /17 at 19:45


Digoxin (Lanoxin) 0.125 mg DAILY PO  Last administered on 7/3/17at 08:28;  

Start 7/2/17 at 09:00


Docusate Sodium (Colace) 100 mg BID PO  Last administered on 7/3/17at 20:47;  

Start 6/29/17 at 21:00


Enoxaparin Sodium 30 mg 30 mg Q24H SQ  Last administered on 7/4/17at 10:21;  

Start 7/1/17 at 09:00


Ergocalciferol (Drisdol) 50,000 units ONCE  ONCE PO ;  Start 6/30/17 at 10:00;  

Stop 6/30/17 at 10:01;  Status DC


Famotidine (Pepcid Inj) 20 mg Q12HR IV PUSH  Last administered on 7/4/17at 10:22

;  Start 6/30/17 at 13:30


Glucagon (Glucagon Inj) 1 mg UNSCH  PRN OTHER HYPOGLYCEMIA-SEE COMMENTS;  Start 

6/30/17 at 13:30


Insulin Aspart (NovoLOG SUPPLEMENTAL SCALE) 1 ACHS SLIDING  SCALE SQ  Last 

administered on 6/30/17at 20:40;  Start 6/30/17 at 16:00


Insulin Human Regular (NovoLIN R INJ) See Protocol Table ... ON CALL  PRN SQ 

SEE PROTOCOL TABLE;  Start 6/29/17 at 22:00;  Stop 7/2/17 at 21:59;  Status DC


IV Flush (NS Flush) 2 ml BID IVF  Last administered on 7/3/17at 08:28;  Start 6/ 30/17 at 21:00


Ketamine HCl 5 mg 5 mg ONCE  ONCE IV PUSH  Last administered on 6/29/17at 14:43

;  Start 6/29/17 at 14:45;  Stop 6/29/17 at 14:46;  Status DC


Lactated Ringer's 1,000 ml @  30 mls/hr Q24H PRN IV SEE LABEL COMMENTS;  Start 6 /29/17 at 22:00;  Stop 7/2/17 at 21:59;  Status DC


Magnesium Hydroxide (Milk Of Magnesia Liq) 30 ml BID PO  Last administered on 7/ 4/17at 10:21;  Start 7/3/17 at 10:30


Metoprolol Tartrate (Lopressor) 25 mg ON CALL  PRN PO SEE LABEL COMMENTS;  

Start 6/29/17 at 22:00;  Stop 7/2/17 at 21:59;  Status DC


Miscellaneous Information ALL NURSING DEPARTME... UNSCH  PRN .XX SEE LABEL 

COMMENTS;  Start 6/30/17 at 09:46;  Stop 7/1/17 at 09:45;  Status DC


Morphine Sulfate (Morphine Inj) 3 mg Q3H  PRN IV PUSH break thru pain Last 

administered on 7/1/17at 22:39;  Start 6/30/17 at 09:45


Naloxone HCl 0.4 mg 0.4 mg UNSCH  PRN IV RESPIRATORY RATE LESS THAN 10;  Start 6 /29/17 at 16:15


Ondansetron HCl (Zofran Inj) 4 mg Q6H  PRN IVP NAUSEA OR VOMITING;  Start 6/29/ 17 at 16:15


Pneumococcal Polyvalent Vaccine (Pneumovax-23 Inj) 25 mcg ONCE ONCE IM ;  Start 

6/30/17 at 10:00;  Stop 6/30/17 at 10:00;  Status DC


Povidone Iodine (Betadine 5% Antisepsis Kit) 1 applic ON CALL  PRN EACH NARE 

SEE LABEL COMMENTS;  Start 6/29/17 at 22:00;  Stop 7/2/17 at 21:59;  Status DC


Sodium Chloride (NS 1000 ml Inj) 1,000 ml @  100 mls/hr Q10H IV  Last 

administered on 7/3/17at 20:48;  Start 6/29/17 at 16:12


Sodium Chloride ( ml Inj) 500 ml @  30 mls/hr [id number] PRN IV SEE LABEL 

COMMENTS;  Start 6/29/17 at 22:00;  Stop 7/2/17 at 21:59;  Status DC


Sodium Chloride (NS Flush) 2 ml BID IV FLUSH ;  Start 6/29/17 at 21:00;  Stop 6/ 30/17 at 09:57;  Status DC





Assessment and Plan


Problem List:  


(1) Fracture, proximal femur


Status:  Acute


Plan:  orif completed  will now need rehab





(2) Anemia


Status:  Acute


Plan:  type cross transfuse  2 units prbc  follow hh closely





Assessment and Plan


post op   will probably require a sitter once awake


Discussed Condition With


nursing


Discharge Planning


[facility]





Problem Qualifiers





(1) Fracture, proximal femur:  


Qualified Code:  S72.001A - Fracture, proximal femur, right, closed, initial 

encounter


(2) Anemia:  





Tank Santacruz DO Jul 4, 2017 12:25

## 2017-07-05 VITALS
DIASTOLIC BLOOD PRESSURE: 82 MMHG | RESPIRATION RATE: 17 BRPM | TEMPERATURE: 97 F | SYSTOLIC BLOOD PRESSURE: 175 MMHG | HEART RATE: 69 BPM | OXYGEN SATURATION: 97 %

## 2017-07-05 VITALS
DIASTOLIC BLOOD PRESSURE: 77 MMHG | TEMPERATURE: 97.1 F | OXYGEN SATURATION: 95 % | SYSTOLIC BLOOD PRESSURE: 173 MMHG | RESPIRATION RATE: 17 BRPM | HEART RATE: 75 BPM

## 2017-07-05 VITALS — HEART RATE: 77 BPM

## 2017-07-05 VITALS
DIASTOLIC BLOOD PRESSURE: 81 MMHG | SYSTOLIC BLOOD PRESSURE: 178 MMHG | OXYGEN SATURATION: 96 % | HEART RATE: 86 BPM | TEMPERATURE: 96.3 F | RESPIRATION RATE: 19 BRPM

## 2017-07-05 VITALS
TEMPERATURE: 97.6 F | HEART RATE: 76 BPM | SYSTOLIC BLOOD PRESSURE: 174 MMHG | RESPIRATION RATE: 17 BRPM | DIASTOLIC BLOOD PRESSURE: 74 MMHG | OXYGEN SATURATION: 96 %

## 2017-07-05 VITALS
RESPIRATION RATE: 18 BRPM | SYSTOLIC BLOOD PRESSURE: 112 MMHG | TEMPERATURE: 96.7 F | OXYGEN SATURATION: 96 % | HEART RATE: 59 BPM | DIASTOLIC BLOOD PRESSURE: 55 MMHG

## 2017-07-05 VITALS
OXYGEN SATURATION: 95 % | RESPIRATION RATE: 18 BRPM | HEART RATE: 65 BPM | DIASTOLIC BLOOD PRESSURE: 70 MMHG | TEMPERATURE: 96.3 F | SYSTOLIC BLOOD PRESSURE: 151 MMHG

## 2017-07-05 VITALS — HEART RATE: 58 BPM

## 2017-07-05 LAB
ANION GAP SERPL CALC-SCNC: 7 MEQ/L (ref 5–15)
BASOPHILS # BLD AUTO: 0 TH/MM3 (ref 0–0.2)
BASOPHILS NFR BLD: 0.2 % (ref 0–2)
BUN SERPL-MCNC: 7 MG/DL (ref 7–18)
CHLORIDE SERPL-SCNC: 102 MEQ/L (ref 98–107)
EOSINOPHIL # BLD: 0.1 TH/MM3 (ref 0–0.4)
EOSINOPHIL NFR BLD: 1.4 % (ref 0–4)
ERYTHROCYTE [DISTWIDTH] IN BLOOD BY AUTOMATED COUNT: 13.8 % (ref 11.6–17.2)
GFR SERPLBLD BASED ON 1.73 SQ M-ARVRAT: 197 ML/MIN (ref 89–?)
HCO3 BLD-SCNC: 29.8 MEQ/L (ref 21–32)
HCT VFR BLD CALC: 35.1 % (ref 35–46)
HEMO FLAGS: (no result)
LYMPHOCYTES # BLD AUTO: 0.7 TH/MM3 (ref 1–4.8)
LYMPHOCYTES NFR BLD AUTO: 9.2 % (ref 9–44)
MCH RBC QN AUTO: 29.8 PG (ref 27–34)
MCHC RBC AUTO-ENTMCNC: 34.3 % (ref 32–36)
MCV RBC AUTO: 86.8 FL (ref 80–100)
MONOCYTES NFR BLD: 8.3 % (ref 0–8)
NEUTROPHILS # BLD AUTO: 6.5 TH/MM3 (ref 1.8–7.7)
NEUTROPHILS NFR BLD AUTO: 80.9 % (ref 16–70)
PLATELET # BLD: 267 TH/MM3 (ref 150–450)
POTASSIUM SERPL-SCNC: 3 MEQ/L (ref 3.5–5.1)
RBC # BLD AUTO: 4.05 MIL/MM3 (ref 4–5.3)
SODIUM SERPL-SCNC: 139 MEQ/L (ref 136–145)
WBC # BLD AUTO: 8 TH/MM3 (ref 4–11)

## 2017-07-05 RX ADMIN — PHENYTOIN SODIUM SCH MLS/HR: 50 INJECTION INTRAMUSCULAR; INTRAVENOUS at 01:06

## 2017-07-05 RX ADMIN — BISACODYL SCH MG: 5 TABLET, COATED ORAL at 22:31

## 2017-07-05 RX ADMIN — CALCIUM CARBONATE-CHOLECALCIFEROL TAB 250 MG-125 UNIT SCH MG: 250-125 TAB at 08:19

## 2017-07-05 RX ADMIN — INSULIN ASPART SCH: 100 INJECTION, SOLUTION INTRAVENOUS; SUBCUTANEOUS at 06:24

## 2017-07-05 RX ADMIN — MAGNESIUM HYDROXIDE SCH ML: 400 SUSPENSION ORAL at 09:00

## 2017-07-05 RX ADMIN — DOCUSATE SODIUM SCH MG: 100 CAPSULE, LIQUID FILLED ORAL at 08:19

## 2017-07-05 RX ADMIN — INSULIN ASPART SCH: 100 INJECTION, SOLUTION INTRAVENOUS; SUBCUTANEOUS at 11:00

## 2017-07-05 RX ADMIN — ENOXAPARIN SODIUM SCH MG: 30 INJECTION SUBCUTANEOUS at 08:20

## 2017-07-05 RX ADMIN — FAMOTIDINE SCH MG: 10 INJECTION, SOLUTION INTRAVENOUS at 22:29

## 2017-07-05 RX ADMIN — SODIUM CHLORIDE, PRESERVATIVE FREE SCH ML: 5 INJECTION INTRAVENOUS at 22:29

## 2017-07-05 RX ADMIN — FAMOTIDINE SCH MG: 10 INJECTION, SOLUTION INTRAVENOUS at 08:20

## 2017-07-05 RX ADMIN — HYDROCODONE BITARTRATE AND ACETAMINOPHEN PRN TAB: 5; 325 TABLET ORAL at 06:23

## 2017-07-05 RX ADMIN — SODIUM CHLORIDE, PRESERVATIVE FREE SCH ML: 5 INJECTION INTRAVENOUS at 08:20

## 2017-07-05 RX ADMIN — DIGOXIN SCH MG: 125 TABLET ORAL at 08:19

## 2017-07-05 RX ADMIN — HYDROCODONE BITARTRATE AND ACETAMINOPHEN PRN TAB: 5; 325 TABLET ORAL at 22:30

## 2017-07-05 RX ADMIN — CALCIUM CARBONATE-CHOLECALCIFEROL TAB 250 MG-125 UNIT SCH MG: 250-125 TAB at 11:41

## 2017-07-05 RX ADMIN — CHOLECALCIFEROL CAP 125 MCG (5000 UNIT) SCH UNITS: 125 CAP at 08:19

## 2017-07-05 RX ADMIN — CALCIUM CARBONATE-CHOLECALCIFEROL TAB 250 MG-125 UNIT SCH MG: 250-125 TAB at 18:00

## 2017-07-05 RX ADMIN — MAGNESIUM HYDROXIDE SCH ML: 400 SUSPENSION ORAL at 22:31

## 2017-07-05 RX ADMIN — INSULIN ASPART SCH: 100 INJECTION, SOLUTION INTRAVENOUS; SUBCUTANEOUS at 16:00

## 2017-07-05 RX ADMIN — DOCUSATE SODIUM SCH MG: 100 CAPSULE, LIQUID FILLED ORAL at 22:30

## 2017-07-05 RX ADMIN — BISACODYL SCH MG: 5 TABLET, COATED ORAL at 08:18

## 2017-07-05 RX ADMIN — INSULIN ASPART SCH: 100 INJECTION, SOLUTION INTRAVENOUS; SUBCUTANEOUS at 22:35

## 2017-07-05 NOTE — HHI.DCPOC
Discharge Care Plan


Diagnosis:  


(1) Fracture, proximal femur


Your Health Problems Are:     Loss of Movements


Goals to Promote Your Health


* To prevent worsening of your condition and complications


* To maintain your health at the optimal level


Directions to Meet Your Goals


*** Take your medications as prescribed


*** Follow your dietary instruction


*** Follow activity as directed








*** Keep your appointments as scheduled


*** Take your immunizations and boosters as scheduled


*** If your symptoms worsen call your PCP, if no PCP go to Urgent Care Center 

or Emergency Room***


*** Smoking is Dangerous to Your Health. Avoid second hand smoke***


***Call the 24-hour hour crisis hotline for domestic abuse at 1-584.675.9281***








Gellermann,Diane M. ARNP Jul 5, 2017 09:30

## 2017-07-05 NOTE — HHI.DS
Discharge Summary


Admission Date


Jun 29, 2017 at 16:00


Admitting Diagnosis


femur fracture, dehydration





Procedures


orif hip


Brief History


84 year old female who presents to the emergency department for pain in her 

right leg.  History obtained from records.  Patient was found to have a 

proximal femur fracture. Ortho was consulted and reduction and intramedullary 

nail fixation right femur intertrochanteric and subtrochanteric performed this 

morning.   No fall was noted per  unless it was unwitnessed.  Patient 

normally ambulates independently at home.  She has a past medical history of 

advanced alzheimer disease/ dementia, SVT, MVR, HLD, HTN, and anxiety.  Urine 

sample was found to be abnormal and patient was started on rocephin pending 

cultures.


CBC/BMP:  


7/5/17 0754                                                                    

            7/5/17 0754





Significant Findings





Laboratory Tests








Test 7/3/17 7/5/17





 06:44 07:54


 


Hematocrit 34.6 % 





 (35.0-46.0) 


 


Monocytes (%) (Auto) 9.7 % (0.0-8.0) 8.3 % (0.0-8.0)


 


Potassium Level 3.0 MEQ/L 3.0 MEQ/L





 (3.5-5.1) (3.5-5.1)


 


Creatinine 0.35 MG/DL 0.32 MG/DL





 (0.50-1.00) (0.50-1.00)


 


Random Glucose 113 MG/ MG/DL





 () ()


 


Calcium Level 8.3 MG/DL 8.4 MG/DL





 (8.5-10.1) (8.5-10.1)


 


Neutrophils (%) (Auto)  80.9 %





  (16.0-70.0)


 


Lymphocytes # (Auto)  0.7 TH/MM3





  (1.0-4.8)








PE at Discharge


GENERAL: Alert not oriented talking nonsensical


SKIN: Warm and dry. Drg on right hip


HEAD: Normocephalic.


EYES: No scleral icterus. No injection or drainage. 


NECK: Supple, trachea midline. No JVD or lymphadenopathy.


CARDIOVASCULAR: Regular rate and rhythm without murmurs, gallops, or rubs. 


RESPIRATORY: Breath sounds equal bilaterally. No accessory muscle use.


GASTROINTESTINAL: Abdomen soft, non-tender, nondistended. 


MUSCULOSKELETAL: No cyanosis, or edema. 


BACK: Nontender without obvious deformity. No CVA tenderness.





Hospital Course


84 year old female who presents to the emergency department for pain in her 

right leg.  History obtained from records.  Patient was found to have a 

proximal femur fracture. Ortho was consulted and reduction and intramedullary 

nail fixation right femur intertrochanteric and subtrochanteric performed this 

morning.   No fall was noted per  unless it was unwitnessed.  Patient 

normally ambulates independently at home.  She has a past medical history of 

advanced alzheimer disease/ dementia, SVT, MVR, HLD, HTN, and anxiety.  Urine 

sample was found to be abnormal and patient was started on rocephin which is 

now completed.  Patient progressed nicely however her hospital course was 

completed by a episode of SVT which resolved with carotid massage. Patient has 

participated with PT but has advanced dementia which has made this more 

difficult.  She will be discharged to SNF for further rehab on Xarelto for DVT 

prophylaxis


Pt Condition on Discharge:  Good


Discharge Disposition:  Discharge to SNF


Discharge Instructions


DIET: Follow Instructions for:  As Tolerated, No Restrictions


Activities you can perform:  Regular-No Restrictions


New Medications:  


Calcium Carbonate/Vitamin D3 (Calcium 600 + Vit D Tablet) 1 Each Tablet


1 TAB PO BID Calcium Supplement #60 TAB


Ergocalciferol (Ergocalciferol) 50,000 Unit Cap


85839 UNITS PO Q7D Nutritional Supplement #8 Ref 0 CAP


Hydrocodone-Acetaminophen (Norco) 5-325 mg Tab


1 TAB PO Q4H PRN PAIN #50 Ref 0 TAB


Rivaroxaban (Xarelto) 10 Mg Tab


10 MG PO DAILY Blood Clot Prevention #14 Ref 0 TAB


Acetaminophen (Eq Acetaminophen) 325 Mg Tab


650 MG PO Q4H PRN FEVER #30 TAB


Bisacodyl DR (Bisacodyl EC) 5 Mg Tabec


10 MG PO BID Constipation #30 TAB


Calcium/Vitamin D (Oyster Shell 250 mg + Vit D Tb) 250 Mg Calcium (625 Mg)-125 

Unit Tablet


250 MG PO TID Electrolyte Replacement #90 BOTTLE


Cholecalciferol (Vitamin D3) 5,000 Unit Cap


5000 UNITS PO DAILY Electrolyte Replacement #30 CAP


Digoxin (Digoxin) 0.125 Mg Tab


0.125 MG PO DAILY Regulate Heart Beat #30 TAB


Docusate Sodium (Dok) 100 Mg Cap


100 MG PO BID Constipation #30 CAP


 


Continued Medications:  


Quetiapine (Quetiapine) 50 Mg Tab


50 MG PO HS #30 Ref 0 TAB











Gellermann,Diane M. ARNP Jul 5, 2017 09:36

## 2017-07-05 NOTE — PD.ORT.PN
Subjective


Subjective Remarks


POD 5 s/p IMN right hip


no changes. confused and does not verbalize





Objective


Vitals





 Vital Signs








  Date Time  Temp Pulse Resp B/P Pulse Ox O2 Delivery O2 Flow Rate FiO2


 


7/5/17 04:45 96.3 65 18 151/70 95   


 


7/5/17 00:25 96.7 59 18 112/55 96   


 


7/5/17 00:02  58      


 


7/4/17 21:00 96.3 59 17 164/74 96   


 


7/4/17 20:00  69      


 


7/4/17 18:56      Room Air  


 


7/4/17 15:00 97.6 65 16 157/82 94   


 


7/4/17 11:31 96.9 71 16 148/71 95   


 


7/4/17 08:08 96.7 57 16 132/66 100   








 I/O








 7/4/17 7/4/17 7/4/17 7/5/17 7/5/17 7/5/17





 07:00 15:00 23:00 07:00 15:00 23:00


 


Intake Total 960 ml 1490 ml 651 ml 920 ml  


 


Balance 960 ml 1490 ml 651 ml 920 ml  


 


      


 


Intake Oral 50 ml 480 ml 120 ml   


 


IV Total 910 ml 1010 ml 531 ml 920 ml  


 


# Voids 3 3 2   


 


# Bowel Movements 0 1 0   








Result Diagram:  


7/3/17 0644                                                                    

            7/3/17 0644





Imaging





Last 72 hours Impressions








Hip and Pelvis X-Ray 6/29/17 0000 Signed





Impressions: 





 Service Date/Time:  Thursday, June 29, 2017 15:09 - CONCLUSION:  Comminuted 4 





 fragment intertrochanteric fracture of the right hip  Intact left hip and AP 





 pelvis.     Fletcher Medina MD 


 


Head CT 6/29/17 0000 Signed





Impressions: 





 Service Date/Time:  Thursday, June 29, 2017 15:23 - CONCLUSION:  1. Severe 





 diffuse cerebral atrophy.  2. Moderate periventricular and subcortical white 





 matter small vessels changes bilaterally.  3. No acute infarct, acute 





 hemorrhage, mass effect or extra-axial fluid collections.    Tomas Bang MD 


 


Femur X-Ray 6/29/17 0000 Signed





Impressions: 





 Service Date/Time:  Thursday, June 29, 2017 15:04 - CONCLUSION:  Proximal 

right 





 femur fracture     Anatoliy Reyna MD 


 


Chest X-Ray 6/29/17 0000 Signed





Impressions: 





 Service Date/Time:  Thursday, June 29, 2017 15:23 - CONCLUSION:   1.  No acute 





 cardiopulmonary disease.   2.  Degenerative changes and scoliosis of the 





 thoracolumbar spine.     Tomas Bang MD 


 


Cervical Spine CT 6/29/17 0000 Signed





Impressions: 





 Service Date/Time:  Thursday, June 29, 2017 15:23 - CONCLUSION:  1.  No acute 





 fracture or prevertebral soft tissue swelling. 2.  Moderate spinal stenosis 

and 





 bilateral foraminal narrowing at C4-5.  3.  Moderate bilateral foraminal 





 narrowing but no spinal stenosis at C3-4, C5-6 and C6-7.  4.  Grade I 





 retrolisthesis of C4 in relation to C3 and C5.  5.  Grade I anterolisthesis of 





 C7 in relation to T1.  6.  Diffuse cervical spondylosis and scoliosis of the 





 cervical spine.    Tomas Bang MD 








Objective Remarks


Right lower extremity: Clean dry dressings intact. She has mild swelling. Good 

capillary refills in distal pulses





Assessment & Plan


Assessment and Plan


POD # 5 Reduction and intramedullary nail fixation right femur 

intertrochanteric and subtrochanteric fractures


Pain management - Norco


DVT prophylaxis - Lovenox


Physical therapy - 50% weight bearing


daily dressing changes 


D/C planning - anticipating SNF when bed available. Orthopedic cleared.


Follow-up appointment with Dr. Tran or PA in 2 weeks








Laron Hurd Jul 5, 2017 06:43

## 2017-07-06 VITALS
DIASTOLIC BLOOD PRESSURE: 60 MMHG | OXYGEN SATURATION: 96 % | RESPIRATION RATE: 17 BRPM | HEART RATE: 57 BPM | SYSTOLIC BLOOD PRESSURE: 137 MMHG | TEMPERATURE: 96.9 F

## 2017-07-06 VITALS
SYSTOLIC BLOOD PRESSURE: 141 MMHG | HEART RATE: 69 BPM | TEMPERATURE: 96.5 F | DIASTOLIC BLOOD PRESSURE: 63 MMHG | RESPIRATION RATE: 17 BRPM | OXYGEN SATURATION: 95 %

## 2017-07-06 VITALS
DIASTOLIC BLOOD PRESSURE: 75 MMHG | SYSTOLIC BLOOD PRESSURE: 182 MMHG | RESPIRATION RATE: 19 BRPM | HEART RATE: 86 BPM | OXYGEN SATURATION: 97 % | TEMPERATURE: 97.3 F

## 2017-07-06 VITALS
HEART RATE: 62 BPM | RESPIRATION RATE: 18 BRPM | OXYGEN SATURATION: 97 % | TEMPERATURE: 96.5 F | SYSTOLIC BLOOD PRESSURE: 175 MMHG | DIASTOLIC BLOOD PRESSURE: 68 MMHG

## 2017-07-06 VITALS
TEMPERATURE: 95.8 F | HEART RATE: 76 BPM | SYSTOLIC BLOOD PRESSURE: 167 MMHG | OXYGEN SATURATION: 97 % | DIASTOLIC BLOOD PRESSURE: 88 MMHG | RESPIRATION RATE: 19 BRPM

## 2017-07-06 VITALS
TEMPERATURE: 96.7 F | OXYGEN SATURATION: 96 % | HEART RATE: 60 BPM | DIASTOLIC BLOOD PRESSURE: 60 MMHG | SYSTOLIC BLOOD PRESSURE: 132 MMHG | RESPIRATION RATE: 18 BRPM

## 2017-07-06 VITALS — HEART RATE: 61 BPM

## 2017-07-06 VITALS — HEART RATE: 68 BPM

## 2017-07-06 RX ADMIN — POTASSIUM CHLORIDE SCH MEQ: 750 CAPSULE, EXTENDED RELEASE ORAL at 10:11

## 2017-07-06 RX ADMIN — CALCIUM CARBONATE-CHOLECALCIFEROL TAB 250 MG-125 UNIT SCH MG: 250-125 TAB at 14:59

## 2017-07-06 RX ADMIN — SODIUM CHLORIDE, PRESERVATIVE FREE SCH ML: 5 INJECTION INTRAVENOUS at 22:57

## 2017-07-06 RX ADMIN — MAGNESIUM HYDROXIDE SCH ML: 400 SUSPENSION ORAL at 10:09

## 2017-07-06 RX ADMIN — BISACODYL SCH MG: 5 TABLET, COATED ORAL at 10:11

## 2017-07-06 RX ADMIN — HYDROCODONE BITARTRATE AND ACETAMINOPHEN PRN TAB: 5; 325 TABLET ORAL at 10:21

## 2017-07-06 RX ADMIN — SODIUM CHLORIDE, PRESERVATIVE FREE SCH ML: 5 INJECTION INTRAVENOUS at 09:00

## 2017-07-06 RX ADMIN — FAMOTIDINE SCH MG: 10 INJECTION, SOLUTION INTRAVENOUS at 23:00

## 2017-07-06 RX ADMIN — DOCUSATE SODIUM SCH MG: 100 CAPSULE, LIQUID FILLED ORAL at 22:57

## 2017-07-06 RX ADMIN — HYDROCODONE BITARTRATE AND ACETAMINOPHEN PRN TAB: 5; 325 TABLET ORAL at 15:00

## 2017-07-06 RX ADMIN — INSULIN ASPART SCH: 100 INJECTION, SOLUTION INTRAVENOUS; SUBCUTANEOUS at 06:08

## 2017-07-06 RX ADMIN — DIGOXIN SCH MG: 125 TABLET ORAL at 10:10

## 2017-07-06 RX ADMIN — POTASSIUM CHLORIDE SCH MEQ: 750 CAPSULE, EXTENDED RELEASE ORAL at 22:56

## 2017-07-06 RX ADMIN — INSULIN ASPART SCH: 100 INJECTION, SOLUTION INTRAVENOUS; SUBCUTANEOUS at 16:00

## 2017-07-06 RX ADMIN — CHOLECALCIFEROL CAP 125 MCG (5000 UNIT) SCH UNITS: 125 CAP at 10:09

## 2017-07-06 RX ADMIN — DOCUSATE SODIUM SCH MG: 100 CAPSULE, LIQUID FILLED ORAL at 10:11

## 2017-07-06 RX ADMIN — MAGNESIUM HYDROXIDE SCH ML: 400 SUSPENSION ORAL at 22:56

## 2017-07-06 RX ADMIN — ENOXAPARIN SODIUM SCH MG: 30 INJECTION SUBCUTANEOUS at 10:09

## 2017-07-06 RX ADMIN — INSULIN ASPART SCH: 100 INJECTION, SOLUTION INTRAVENOUS; SUBCUTANEOUS at 22:57

## 2017-07-06 RX ADMIN — CALCIUM CARBONATE-CHOLECALCIFEROL TAB 250 MG-125 UNIT SCH MG: 250-125 TAB at 10:09

## 2017-07-06 RX ADMIN — CALCIUM CARBONATE-CHOLECALCIFEROL TAB 250 MG-125 UNIT SCH MG: 250-125 TAB at 18:24

## 2017-07-06 RX ADMIN — INSULIN ASPART SCH: 100 INJECTION, SOLUTION INTRAVENOUS; SUBCUTANEOUS at 11:00

## 2017-07-06 RX ADMIN — HYDROCODONE BITARTRATE AND ACETAMINOPHEN PRN TAB: 5; 325 TABLET ORAL at 06:08

## 2017-07-06 RX ADMIN — HYDROCODONE BITARTRATE AND ACETAMINOPHEN PRN TAB: 5; 325 TABLET ORAL at 23:04

## 2017-07-06 RX ADMIN — FAMOTIDINE SCH MG: 10 INJECTION, SOLUTION INTRAVENOUS at 10:11

## 2017-07-06 RX ADMIN — BISACODYL SCH MG: 5 TABLET, COATED ORAL at 22:57

## 2017-07-06 NOTE — PD.ORT.PN
Subjective


Subjective Remarks


No new complaints. Dementia





Objective


Vitals





 Vital Signs








  Date Time  Temp Pulse Resp B/P Pulse Ox O2 Delivery O2 Flow Rate FiO2


 


7/6/17 04:15  61      


 


7/6/17 04:10 96.9 57 17 137/60 96   


 


7/6/17 00:50 96.7 60 18 132/60 96   


 


7/6/17 00:05  68      


 


7/5/17 20:40 96.3 86 19 178/81 96   


 


7/5/17 20:00  77      


 


7/5/17 18:52      Room Air  


 


7/5/17 16:00 97.0 69 17 175/82 97   


 


7/5/17 11:00 97.6 76 17 174/74 96   








 I/O








 7/5/17 7/5/17 7/5/17 7/6/17 7/6/17 7/6/17





 07:00 15:00 23:00 07:00 15:00 23:00


 


Intake Total 920 ml 480 ml 120 ml 637 ml  


 


Output Total 3 ml     


 


Balance 917 ml 480 ml 120 ml 637 ml  


 


      


 


Intake Oral 0 ml 480 ml 120 ml   


 


IV Total 920 ml   637 ml  


 


Output Urine Total 3 ml     


 


# Voids  4 1   


 


# Bowel Movements 0 0 0   








Result Diagram:  


7/5/17 0754                                                                    

            7/5/17 1552





Imaging





Last 72 hours Impressions








Hip and Pelvis X-Ray 6/29/17 0000 Signed





Impressions: 





 Service Date/Time:  Thursday, June 29, 2017 15:09 - CONCLUSION:  Comminuted 4 





 fragment intertrochanteric fracture of the right hip  Intact left hip and AP 





 pelvis.     Fletcher Medina MD 


 


Head CT 6/29/17 0000 Signed





Impressions: 





 Service Date/Time:  Thursday, June 29, 2017 15:23 - CONCLUSION:  1. Severe 





 diffuse cerebral atrophy.  2. Moderate periventricular and subcortical white 





 matter small vessels changes bilaterally.  3. No acute infarct, acute 





 hemorrhage, mass effect or extra-axial fluid collections.    Tomas Bang MD 


 


Femur X-Ray 6/29/17 0000 Signed





Impressions: 





 Service Date/Time:  Thursday, June 29, 2017 15:04 - CONCLUSION:  Proximal 

right 





 femur fracture     Anatoliy Reyna MD 


 


Chest X-Ray 6/29/17 0000 Signed





Impressions: 





 Service Date/Time:  Thursday, June 29, 2017 15:23 - CONCLUSION:   1.  No acute 





 cardiopulmonary disease.   2.  Degenerative changes and scoliosis of the 





 thoracolumbar spine.     Tomas Bang MD 


 


Cervical Spine CT 6/29/17 0000 Signed





Impressions: 





 Service Date/Time:  Thursday, June 29, 2017 15:23 - CONCLUSION:  1.  No acute 





 fracture or prevertebral soft tissue swelling. 2.  Moderate spinal stenosis 

and 





 bilateral foraminal narrowing at C4-5.  3.  Moderate bilateral foraminal 





 narrowing but no spinal stenosis at C3-4, C5-6 and C6-7.  4.  Grade I 





 retrolisthesis of C4 in relation to C3 and C5.  5.  Grade I anterolisthesis of 





 C7 in relation to T1.  6.  Diffuse cervical spondylosis and scoliosis of the 





 cervical spine.    Tomas Bang MD 








Objective Remarks


Right lower extremity: Clean dry dressings intact. She has mild swelling. Good 

capillary refills in distal pulses





Assessment & Plan


Assessment and Plan


POD # 6 Reduction and intramedullary nail fixation right femur 

intertrochanteric and subtrochanteric fractures


Pain management - Norco


DVT prophylaxis - Lovenox


Physical therapy - 50% weight bearing


daily dressing changes 


D/C planning - anticipating SNF when bed available. Orthopedic cleared.


Follow-up appointment with Dr. Tran or PA in 2 weeks








Gerber Calloway Jr. Jul 6, 2017 07:04

## 2017-07-06 NOTE — HHI.DS
Discharge Summary


Admission Date


Jun 29, 2017 at 16:00


Admitting Diagnosis


femur fracture, dehydration





Procedures


orif hip


Brief History


84 year old female who presents to the emergency department for pain in her 

right leg.  History obtained from records.  Patient was found to have a 

proximal femur fracture. Ortho was consulted and reduction and intramedullary 

nail fixation right femur intertrochanteric and subtrochanteric performed this 

morning.   No fall was noted per  unless it was unwitnessed.  Patient 

normally ambulates independently at home.  She has a past medical history of 

advanced alzheimer disease/ dementia, SVT, MVR, HLD, HTN, and anxiety.  Urine 

sample was found to be abnormal and patient was started on rocephin pending 

cultures.


CBC/BMP:  


7/5/17 0754                                                                    

            7/5/17 1552





Significant Findings





Laboratory Tests








Test 7/5/17





 07:54


 


Neutrophils (%) (Auto) 80.9 %





 (16.0-70.0)


 


Monocytes (%) (Auto) 8.3 % (0.0-8.0)


 


Lymphocytes # (Auto) 0.7 TH/MM3





 (1.0-4.8)


 


Potassium Level 3.0 MEQ/L





 (3.5-5.1)


 


Creatinine 0.32 MG/DL





 (0.50-1.00)


 


Random Glucose 107 MG/DL





 ()


 


Calcium Level 8.4 MG/DL





 (8.5-10.1)








PE at Discharge


GENERAL: Alert not oriented talking nonsensical


SKIN: Warm and dry. Drg on right hip


HEAD: Normocephalic.


EYES: No scleral icterus. No injection or drainage. 


NECK: Supple, trachea midline. No JVD or lymphadenopathy.


CARDIOVASCULAR: Regular rate and rhythm without murmurs, gallops, or rubs. 


RESPIRATORY: Breath sounds equal bilaterally. No accessory muscle use.


GASTROINTESTINAL: Abdomen soft, non-tender, nondistended. 


MUSCULOSKELETAL: No cyanosis, or edema. 


BACK: Nontender without obvious deformity. No CVA tenderness.





Hospital Course


84 year old female who presents to the emergency department for pain in her 

right leg.  History obtained from records.  Patient was found to have a 

proximal femur fracture. Ortho was consulted and reduction and intramedullary 

nail fixation right femur intertrochanteric and subtrochanteric performed this 

morning.   No fall was noted per  unless it was unwitnessed.  Patient 

normally ambulates independently at home.  She has a past medical history of 

advanced Alzheimer disease/ dementia, SVT, MVR, HLD, HTN, and anxiety.  Urine 

sample was found to be abnormal and patient was started on Rocephin which is 

now completed.  Patient progressed nicely however her hospital course was 

complicated by a episode of SVT which resolved with carotid massage. Patient 

has participated with PT but has advanced dementia which has made this more 

difficult.  She will be discharged to SNF for further rehab on Xarelto for DVT 

prophylaxis.  Patient continues to be cleared for discharge awaiting placement 

at [facility].


Pt Condition on Discharge:  Good


Discharge Disposition:  Discharge to SNF


Discharge Instructions


DIET: Follow Instructions for:  As Tolerated, No Restrictions


Activities you can perform:  Regular-No Restrictions


Follow up Referrals:  


Orthopedics - 2 Weeks @ [facility] with Ovidio Xavier MD





New Medications:  


Calcium Carbonate/Vitamin D3 (Calcium 600 + Vit D Tablet) 1 Each Tablet


1 TAB PO BID Calcium Supplement #60 TAB


Ergocalciferol (Ergocalciferol) 50,000 Unit Cap


47843 UNITS PO Q7D Nutritional Supplement #8 Ref 0 CAP


Hydrocodone-Acetaminophen (Norco) 5-325 mg Tab


1 TAB PO Q4H PRN PAIN #50 Ref 0 TAB


Rivaroxaban (Xarelto) 10 Mg Tab


10 MG PO DAILY Blood Clot Prevention #14 Ref 0 TAB


Acetaminophen (Eq Acetaminophen) 325 Mg Tab


650 MG PO Q4H PRN FEVER #30 TAB


Bisacodyl DR (Bisacodyl EC) 5 Mg Tabec


10 MG PO BID Constipation #30 TAB


Calcium/Vitamin D (Oyster Shell 250 mg + Vit D Tb) 250 Mg Calcium (625 Mg)-125 

Unit Tablet


250 MG PO TID Electrolyte Replacement #90 BOTTLE


Cholecalciferol (Vitamin D3) 5,000 Unit Cap


5000 UNITS PO DAILY Electrolyte Replacement #30 CAP


Digoxin (Digoxin) 0.125 Mg Tab


0.125 MG PO DAILY Regulate Heart Beat #30 TAB


Docusate Sodium (Dok) 100 Mg Cap


100 MG PO BID Constipation #30 CAP


 


Continued Medications:  


Quetiapine (Quetiapine) 50 Mg Tab


50 MG PO HS #30 Ref 0 TAB











Gellermann,Diane M. ARNP Jul 6, 2017 09:31

## 2017-07-06 NOTE — PD.CARD.PN
Subjective


Subjective Remarks


Patient doing better, less agitated today. HR 57-86, -178





Objective


Medications





 Current Medications








 Medications


  (Trade)  Dose


 Ordered  Sig/Sukhi


 Route  Start Time


 Stop Time Status Last Admin


 


  (Zofran Inj)  4 mg  Q6H  PRN


 IVP  6/29/17 16:15


     


 


 


  (Tylenol)  650 mg  Q4H  PRN


 PO  6/29/17 16:15


     


 


 


  (Colace)  100 mg  BID


 PO  6/29/17 21:00


    7/6/17 10:11


 


 


  (Narcan Inj)  0.4 mg  UNSCH  PRN


 IV  6/29/17 16:15


     


 


 


  (Norco  5-325 Mg)  1 tab  Q4H  PRN


 PO  6/30/17 00:15


    7/6/17 10:21


 


 


  (NS Flush)  2 ml  UNSCH  PRN


 IVF  6/30/17 09:45


     


 


 


  (NS Flush)  2 ml  BID


 IVF  6/30/17 21:00


    7/5/17 08:20


 


 


  (Lovenox Inj)  30 mg  Q24H


 SQ  7/1/17 09:00


    7/6/17 10:09


 


 


  (Oscal-D 250-125)  250 mg  TID


 PO  6/30/17 13:00


    7/6/17 10:09


 


 


  (Morphine Inj)  3 mg  Q3H  PRN


 IV PUSH  6/30/17 09:45


    7/1/17 22:39


 


 


  (Vitamin D3)  5,000 units  DAILY


 PO  7/1/17 09:00


    7/6/17 10:09


 


 


  (D50w (Vial) Inj)  50 ml  UNSCH  PRN


 IV  6/30/17 13:30


     


 


 


  (Glucagon Inj)  1 mg  UNSCH  PRN


 OTHER  6/30/17 13:30


     


 


 


  (Pepcid Inj)  20 mg  Q12HR


 IV PUSH  6/30/17 13:30


    7/6/17 10:11


 


 


  (Lanoxin)  0.125 mg  DAILY


 PO  7/2/17 09:00


    7/6/17 10:10


 


 


  (Lanoxin Inj)  0.125 mg  DAILY  PRN


 IV PUSH  7/1/17 19:45


     


 


 


  (Milk Of


 Magnesia Liq)  30 ml  BID


 PO  7/3/17 10:30


    7/6/17 10:09


 


 


  (Dulcolax Ec)  10 mg  BID


 PO  7/3/17 10:30


    7/6/17 10:11


 


 


  (KCl)  10 meq  BID


 PO  7/6/17 09:00


    7/6/17 10:11


 








Vital Signs / I&O





 Vital Signs








  Date Time  Temp Pulse Resp B/P Pulse Ox O2 Delivery O2 Flow Rate FiO2


 


7/6/17 08:00 95.8 76 19 167/88 97   


 


7/6/17 04:15  61      


 


7/6/17 04:10 96.9 57 17 137/60 96   


 


7/6/17 00:50 96.7 60 18 132/60 96   


 


7/6/17 00:05  68      


 


7/5/17 20:40 96.3 86 19 178/81 96   


 


7/5/17 20:00  77      


 


7/5/17 18:52      Room Air  


 


7/5/17 16:00 97.0 69 17 175/82 97   


 


7/5/17 11:00 97.6 76 17 174/74 96   








 I/O








 7/5/17 7/5/17 7/5/17 7/6/17 7/6/17 7/6/17





 07:00 15:00 23:00 07:00 15:00 23:00


 


Intake Total 920 ml 480 ml 120 ml 757 ml  


 


Output Total 3 ml     


 


Balance 917 ml 480 ml 120 ml 757 ml  


 


      


 


Intake Oral 0 ml 480 ml 120 ml 120 ml  


 


IV Total 920 ml   637 ml  


 


Output Urine Total 3 ml     


 


# Voids  4 1 4  


 


# Bowel Movements 0 0 0 0  








Physical Exam


GENERAL: Elderly female, non verbal, caregiver at bedside


SKIN: Warm and dry.


HEAD: Normocephalic.


EYES: No scleral icterus. No injection or drainage. 


NECK: Supple, trachea midline. 


CARDIOVASCULAR: Regular rate and rhythm 


RESPIRATORY: Breath sounds equal bilaterally. No accessory muscle use.


GASTROINTESTINAL: Abdomen soft, non-tender, nondistended. 


MUSCULOSKELETAL: No cyanosis, or edema. 


BACK: Nontender without obvious deformity. No CVA tenderness.





Laboratory





Laboratory Tests








Test 7/5/17





 15:52


 


Potassium Level 3.9 MEQ/L











Assessment and Plan


Assessment and Plan


ASSESSMENT


1. Post operative supraventricular tachycardia. Converted with carotid massage. 


2. Right hip fracture, no immediate postop open reduction internal fixation of


     A proximal femoral fracture.


3. Advanced age.


4. Advanced dementia.


5. Profound anemia, postoperative bleeding. H/H improving 


6. History of CAD





PLAN:


The patient is known to Dr. Ramirez. 


Change from digoxin to atenolol due to risk for digoxin toxicity and elevated 

blood pressure


The patient is clear for discharge from a cardiac standpoint. Follow up with Dr Ramirez who the patient is known to


The patient was seen and evaluated by Irene Allen Jul 6, 2017 10:58

## 2017-07-07 VITALS — HEART RATE: 56 BPM

## 2017-07-07 VITALS
HEART RATE: 68 BPM | DIASTOLIC BLOOD PRESSURE: 71 MMHG | SYSTOLIC BLOOD PRESSURE: 144 MMHG | TEMPERATURE: 97 F | RESPIRATION RATE: 17 BRPM | OXYGEN SATURATION: 97 %

## 2017-07-07 VITALS
TEMPERATURE: 97.2 F | SYSTOLIC BLOOD PRESSURE: 160 MMHG | OXYGEN SATURATION: 98 % | RESPIRATION RATE: 17 BRPM | DIASTOLIC BLOOD PRESSURE: 66 MMHG | HEART RATE: 70 BPM

## 2017-07-07 VITALS
HEART RATE: 55 BPM | OXYGEN SATURATION: 98 % | SYSTOLIC BLOOD PRESSURE: 119 MMHG | RESPIRATION RATE: 17 BRPM | TEMPERATURE: 96.8 F | DIASTOLIC BLOOD PRESSURE: 58 MMHG

## 2017-07-07 VITALS — HEART RATE: 60 BPM

## 2017-07-07 VITALS — RESPIRATION RATE: 18 BRPM

## 2017-07-07 LAB
ANION GAP SERPL CALC-SCNC: 7 MEQ/L (ref 5–15)
BASOPHILS # BLD AUTO: 0 TH/MM3 (ref 0–0.2)
BASOPHILS NFR BLD: 0.4 % (ref 0–2)
BUN SERPL-MCNC: 12 MG/DL (ref 7–18)
CHLORIDE SERPL-SCNC: 105 MEQ/L (ref 98–107)
EOSINOPHIL # BLD: 0.1 TH/MM3 (ref 0–0.4)
EOSINOPHIL NFR BLD: 1.8 % (ref 0–4)
ERYTHROCYTE [DISTWIDTH] IN BLOOD BY AUTOMATED COUNT: 14.1 % (ref 11.6–17.2)
GFR SERPLBLD BASED ON 1.73 SQ M-ARVRAT: 148 ML/MIN (ref 89–?)
HCO3 BLD-SCNC: 28.2 MEQ/L (ref 21–32)
HCT VFR BLD CALC: 33.6 % (ref 35–46)
HEMO FLAGS: (no result)
LYMPHOCYTES # BLD AUTO: 0.9 TH/MM3 (ref 1–4.8)
LYMPHOCYTES NFR BLD AUTO: 10.7 % (ref 9–44)
MCH RBC QN AUTO: 29.1 PG (ref 27–34)
MCHC RBC AUTO-ENTMCNC: 32.9 % (ref 32–36)
MCV RBC AUTO: 88.4 FL (ref 80–100)
MONOCYTES NFR BLD: 8.5 % (ref 0–8)
NEUTROPHILS # BLD AUTO: 6.3 TH/MM3 (ref 1.8–7.7)
NEUTROPHILS NFR BLD AUTO: 78.6 % (ref 16–70)
PLATELET # BLD: 291 TH/MM3 (ref 150–450)
POTASSIUM SERPL-SCNC: 3.9 MEQ/L (ref 3.5–5.1)
RBC # BLD AUTO: 3.81 MIL/MM3 (ref 4–5.3)
SODIUM SERPL-SCNC: 140 MEQ/L (ref 136–145)
WBC # BLD AUTO: 8.1 TH/MM3 (ref 4–11)

## 2017-07-07 RX ADMIN — DOCUSATE SODIUM SCH MG: 100 CAPSULE, LIQUID FILLED ORAL at 08:52

## 2017-07-07 RX ADMIN — HYDROCODONE BITARTRATE AND ACETAMINOPHEN PRN TAB: 5; 325 TABLET ORAL at 09:42

## 2017-07-07 RX ADMIN — ENOXAPARIN SODIUM SCH MG: 30 INJECTION SUBCUTANEOUS at 08:52

## 2017-07-07 RX ADMIN — INSULIN ASPART SCH: 100 INJECTION, SOLUTION INTRAVENOUS; SUBCUTANEOUS at 07:00

## 2017-07-07 RX ADMIN — CALCIUM CARBONATE-CHOLECALCIFEROL TAB 250 MG-125 UNIT SCH MG: 250-125 TAB at 08:52

## 2017-07-07 RX ADMIN — BISACODYL SCH MG: 5 TABLET, COATED ORAL at 08:50

## 2017-07-07 RX ADMIN — INSULIN ASPART SCH: 100 INJECTION, SOLUTION INTRAVENOUS; SUBCUTANEOUS at 11:00

## 2017-07-07 RX ADMIN — CHOLECALCIFEROL CAP 125 MCG (5000 UNIT) SCH UNITS: 125 CAP at 08:52

## 2017-07-07 RX ADMIN — FAMOTIDINE SCH MG: 10 INJECTION, SOLUTION INTRAVENOUS at 08:51

## 2017-07-07 RX ADMIN — SODIUM CHLORIDE, PRESERVATIVE FREE SCH ML: 5 INJECTION INTRAVENOUS at 08:52

## 2017-07-07 RX ADMIN — MAGNESIUM HYDROXIDE SCH ML: 400 SUSPENSION ORAL at 08:50

## 2017-07-07 RX ADMIN — POTASSIUM CHLORIDE SCH MEQ: 750 CAPSULE, EXTENDED RELEASE ORAL at 08:52

## 2017-07-07 NOTE — PD.CARD.PN
Subjective


Subjective Remarks


No acute events overnight per the RN. BP trending down. HR stable. Few runs of 

multifocal PVCs. Planning for discharge today at 1100.





Objective


Medications





 Current Medications








 Medications


  (Trade)  Dose


 Ordered  Sig/Sukhi


 Route  Start Time


 Stop Time Status Last Admin


 


  (Zofran Inj)  4 mg  Q6H  PRN


 IVP  6/29/17 16:15


     


 


 


  (Tylenol)  650 mg  Q4H  PRN


 PO  6/29/17 16:15


     


 


 


  (Colace)  100 mg  BID


 PO  6/29/17 21:00


    7/7/17 08:52


 


 


  (Narcan Inj)  0.4 mg  UNSCH  PRN


 IV  6/29/17 16:15


     


 


 


  (Norco  5-325 Mg)  1 tab  Q4H  PRN


 PO  6/30/17 00:15


    7/7/17 09:42


 


 


  (NS Flush)  2 ml  UNSCH  PRN


 IVF  6/30/17 09:45


     


 


 


  (NS Flush)  2 ml  BID


 IVF  6/30/17 21:00


    7/7/17 08:52


 


 


  (Lovenox Inj)  30 mg  Q24H


 SQ  7/1/17 09:00


    7/7/17 08:52


 


 


  (Oscal-D 250-125)  250 mg  TID


 PO  6/30/17 13:00


    7/7/17 08:52


 


 


  (Morphine Inj)  3 mg  Q3H  PRN


 IV PUSH  6/30/17 09:45


    7/1/17 22:39


 


 


  (Vitamin D3)  5,000 units  DAILY


 PO  7/1/17 09:00


    7/7/17 08:52


 


 


  (D50w (Vial) Inj)  50 ml  UNSCH  PRN


 IV  6/30/17 13:30


     


 


 


  (Glucagon Inj)  1 mg  UNSCH  PRN


 OTHER  6/30/17 13:30


     


 


 


  (Pepcid Inj)  20 mg  Q12HR


 IV PUSH  6/30/17 13:30


    7/7/17 08:51


 


 


  (Lanoxin Inj)  0.125 mg  DAILY  PRN


 IV PUSH  7/1/17 19:45


     


 


 


  (Milk Of


 Magnesia Liq)  30 ml  BID


 PO  7/3/17 10:30


    7/7/17 08:50


 


 


  (Dulcolax Ec)  10 mg  BID


 PO  7/3/17 10:30


    7/7/17 08:50


 


 


  (KCl)  10 meq  BID


 PO  7/6/17 09:00


    7/7/17 08:52


 


 


  (Tenormin)  12.5 mg  DAILY


 PO  7/7/17 09:00


    7/7/17 08:51


 


 


  (Pill Splitter)  1 ea  UNSCH  PRN


 OTHER  7/7/17 08:45


     


 








Vital Signs / I&O





 Vital Signs








  Date Time  Temp Pulse Resp B/P Pulse Ox O2 Delivery O2 Flow Rate FiO2


 


7/7/17 08:00 97.2 70 17 160/66 98   


 


7/7/17 04:40 96.8 55 17 119/58 98   


 


7/7/17 04:22  56      


 


7/7/17 02:40  60      


 


7/7/17 00:45 97.0 68 17 144/71 97   


 


7/6/17 21:00 96.5 69 17 141/63 95   


 


7/6/17 16:00   16     


 


7/6/17 16:00 97.3 86 19 182/75 97   


 


7/6/17 12:00 96.5 62 18 175/68 97   








 I/O








 7/6/17 7/6/17 7/6/17 7/7/17 7/7/17 7/7/17





 06:59 14:59 22:59 06:59 14:59 22:59


 


Intake Total 757 ml 240 ml 360 ml 50 ml  


 


Balance 757 ml 240 ml 360 ml 50 ml  


 


      


 


Intake Oral 120 ml 240 ml 360 ml 50 ml  


 


IV Total 637 ml     


 


# Voids 4 3 4 3  


 


# Bowel Movements 0  0 0  








Physical Exam


GENERAL: Elderly female, non verbal, more agitated this morning


SKIN: Warm and dry.


HEAD: Normocephalic.


EYES: No scleral icterus. No injection or drainage. 


NECK: Supple, trachea midline. 


CARDIOVASCULAR: Regular rate and rhythm 


RESPIRATORY: Breath sounds equal bilaterally. No accessory muscle use.


GASTROINTESTINAL: Abdomen soft, non-tender, nondistended. 


MUSCULOSKELETAL: No cyanosis, or edema. 


BACK: Nontender without obvious deformity. No CVA tenderness.





Laboratory





Laboratory Tests








Test 7/7/17





 07:32


 


White Blood Count 8.1 TH/MM3


 


Red Blood Count 3.81 MIL/MM3


 


Hemoglobin 11.1 GM/DL


 


Hematocrit 33.6 %


 


Mean Corpuscular Volume 88.4 FL


 


Mean Corpuscular Hemoglobin 29.1 PG


 


Mean Corpuscular Hemoglobin 32.9 %





Concent 


 


Red Cell Distribution Width 14.1 %


 


Platelet Count 291 TH/MM3


 


Mean Platelet Volume 9.1 FL


 


Neutrophils (%) (Auto) 78.6 %


 


Lymphocytes (%) (Auto) 10.7 %


 


Monocytes (%) (Auto) 8.5 %


 


Eosinophils (%) (Auto) 1.8 %


 


Basophils (%) (Auto) 0.4 %


 


Neutrophils # (Auto) 6.3 TH/MM3


 


Lymphocytes # (Auto) 0.9 TH/MM3


 


Monocytes # (Auto) 0.7 TH/MM3


 


Eosinophils # (Auto) 0.1 TH/MM3


 


Basophils # (Auto) 0.0 TH/MM3


 


CBC Comment DIFF FINAL 


 


Differential Comment  


 


Sodium Level 140 MEQ/L


 


Potassium Level 3.9 MEQ/L


 


Chloride Level 105 MEQ/L


 


Carbon Dioxide Level 28.2 MEQ/L


 


Anion Gap 7 MEQ/L


 


Blood Urea Nitrogen 12 MG/DL


 


Creatinine 0.41 MG/DL


 


Estimat Glomerular Filtration 148 ML/MIN





Rate 


 


Random Glucose 114 MG/DL


 


Calcium Level 8.4 MG/DL











Assessment and Plan


Assessment and Plan


ASSESSMENT


1. Post operative supraventricular tachycardia. Converted with carotid massage. 


2. Right hip fracture, no immediate postop open reduction internal fixation of


     A proximal femoral fracture.


3. Advanced age.


4. Advanced dementia.


5. Profound anemia, postoperative bleeding. H/H improving 


6. History of CAD





PLAN: 


Decrease atenolol from 25 to 12.5 mg daily


The patient is clear for discharge from a cardiac standpoint. Follow up with Dr Ramirez who the patient is known to


Assessment and plan discussed with Irene Hodges Jul 7, 2017 10:02

## 2017-07-07 NOTE — PD.ORT.PN
Subjective


Subjective Remarks


No new complaints. Dementia





Objective


Vitals





 Vital Signs








  Date Time  Temp Pulse Resp B/P Pulse Ox O2 Delivery O2 Flow Rate FiO2


 


7/7/17 04:40 96.8 55 17 119/58 98   


 


7/7/17 04:22  56      


 


7/7/17 02:40  60      


 


7/7/17 00:45 97.0 68 17 144/71 97   


 


7/6/17 21:00 96.5 69 17 141/63 95   


 


7/6/17 16:00   16     


 


7/6/17 16:00 97.3 86 19 182/75 97   


 


7/6/17 12:00 96.5 62 18 175/68 97   


 


7/6/17 08:00 95.8 76 19 167/88 97   








 I/O








 7/6/17 7/6/17 7/6/17 7/7/17 7/7/17 7/7/17





 07:00 15:00 23:00 07:00 15:00 23:00


 


Intake Total 757 ml 480 ml 120 ml   


 


Balance 757 ml 480 ml 120 ml   


 


      


 


Intake Oral 120 ml 480 ml 120 ml   


 


IV Total 637 ml     


 


# Voids 4 6 1   


 


# Bowel Movements 0  0   








Result Diagram:  


7/5/17 0754                                                                    

            7/5/17 1552





Imaging





Last 72 hours Impressions








Hip and Pelvis X-Ray 6/29/17 0000 Signed





Impressions: 





 Service Date/Time:  Thursday, June 29, 2017 15:09 - CONCLUSION:  Comminuted 4 





 fragment intertrochanteric fracture of the right hip  Intact left hip and AP 





 pelvis.     Fletcher Medina MD 


 


Head CT 6/29/17 0000 Signed





Impressions: 





 Service Date/Time:  Thursday, June 29, 2017 15:23 - CONCLUSION:  1. Severe 





 diffuse cerebral atrophy.  2. Moderate periventricular and subcortical white 





 matter small vessels changes bilaterally.  3. No acute infarct, acute 





 hemorrhage, mass effect or extra-axial fluid collections.    Tomas Bang MD 


 


Femur X-Ray 6/29/17 0000 Signed





Impressions: 





 Service Date/Time:  Thursday, June 29, 2017 15:04 - CONCLUSION:  Proximal 

right 





 femur fracture     Anatoliy Reyna MD 


 


Chest X-Ray 6/29/17 0000 Signed





Impressions: 





 Service Date/Time:  Thursday, June 29, 2017 15:23 - CONCLUSION:   1.  No acute 





 cardiopulmonary disease.   2.  Degenerative changes and scoliosis of the 





 thoracolumbar spine.     Tomas Bang MD 


 


Cervical Spine CT 6/29/17 0000 Signed





Impressions: 





 Service Date/Time:  Thursday, June 29, 2017 15:23 - CONCLUSION:  1.  No acute 





 fracture or prevertebral soft tissue swelling. 2.  Moderate spinal stenosis 

and 





 bilateral foraminal narrowing at C4-5.  3.  Moderate bilateral foraminal 





 narrowing but no spinal stenosis at C3-4, C5-6 and C6-7.  4.  Grade I 





 retrolisthesis of C4 in relation to C3 and C5.  5.  Grade I anterolisthesis of 





 C7 in relation to T1.  6.  Diffuse cervical spondylosis and scoliosis of the 





 cervical spine.    Tomas Bang MD 








Objective Remarks


Right lower extremity: Clean dry dressings intact. She has mild swelling. Good 

capillary refills in distal pulses





Assessment & Plan


Assessment and Plan


POD # 7 Reduction and intramedullary nail fixation right femur 

intertrochanteric and subtrochanteric fractures


Pain management - Norco


DVT prophylaxis - Lovenox


Physical therapy - 50% weight bearing


daily dressing changes 


D/C planning - anticipating SNF when bed available. Orthopedic cleared.


Follow-up appointment with Dr. Tran or PA in 2 weeks








Gerber Calloway Jr. Jul 7, 2017 07:08

## 2017-07-07 NOTE — HHI.DS
Discharge Summary


Admission Date


Jun 29, 2017 at 16:00


Admitting Diagnosis


femur fracture, dehydration





Procedures


orif hip


Brief History


84 year old female who presents to the emergency department for pain in her 

right leg.  History obtained from records.  Patient was found to have a 

proximal femur fracture. Ortho was consulted and reduction and intramedullary 

nail fixation right femur intertrochanteric and subtrochanteric performed this 

morning.   No fall was noted per  unless it was unwitnessed.  Patient 

normally ambulates independently at home.  She has a past medical history of 

advanced alzheimer disease/ dementia, SVT, MVR, HLD, HTN, and anxiety.  Urine 

sample was found to be abnormal and patient was started on rocephin pending 

cultures.


CBC/BMP:  


7/7/17 0732                                                                    

            7/7/17 0732





Significant Findings





Laboratory Tests








Test 7/5/17 7/7/17





 07:54 07:32


 


Neutrophils (%) (Auto) 80.9 % 78.6 %





 (16.0-70.0) (16.0-70.0)


 


Monocytes (%) (Auto) 8.3 % (0.0-8.0) 8.5 % (0.0-8.0)


 


Lymphocytes # (Auto) 0.7 TH/MM3 0.9 TH/MM3





 (1.0-4.8) (1.0-4.8)


 


Potassium Level 3.0 MEQ/L 





 (3.5-5.1) 


 


Creatinine 0.32 MG/DL 0.41 MG/DL





 (0.50-1.00) (0.50-1.00)


 


Random Glucose 107 MG/ MG/DL





 () ()


 


Calcium Level 8.4 MG/DL 8.4 MG/DL





 (8.5-10.1) (8.5-10.1)


 


Red Blood Count  3.81 MIL/MM3





  (4.00-5.30)


 


Hemoglobin  11.1 GM/DL





  (11.6-15.3)


 


Hematocrit  33.6 %





  (35.0-46.0)








PE at Discharge


GENERAL: Alert not oriented talking nonsensical


SKIN: Warm and dry. Drg on right hip


HEAD: Normocephalic.


EYES: No scleral icterus. No injection or drainage. 


NECK: Supple, trachea midline. No JVD or lymphadenopathy.


CARDIOVASCULAR: Regular rate and rhythm without murmurs, gallops, or rubs. 


RESPIRATORY: Breath sounds equal bilaterally. No accessory muscle use.


GASTROINTESTINAL: Abdomen soft, non-tender, nondistended. 


MUSCULOSKELETAL: No cyanosis, or edema. 


BACK: Nontender without obvious deformity. No CVA tenderness.





Hospital Course


84 year old female who presents to the emergency department for pain in her 

right leg.  History obtained from records.  Patient was found to have a 

proximal femur fracture. Ortho was consulted and reduction and intramedullary 

nail fixation right femur intertrochanteric and subtrochanteric performed this 

morning.   No fall was noted per  unless it was unwitnessed.  Patient 

normally ambulates independently at home.  She has a past medical history of 

advanced Alzheimer disease/ dementia, SVT, MVR, HLD, HTN, and anxiety.  Urine 

sample was found to be abnormal and patient was started on Rocephin which is 

now completed.  Patient progressed nicely however her hospital course was 

complicated by a episode of SVT which resolved with carotid massage. Patient 

has participated with PT but has advanced dementia which has made this more 

difficult.  She will be discharged to SNF for further rehab on Xarelto for DVT 

prophylaxis.  Plans to be discharged to [facility] today.


Pt Condition on Discharge:  Good


Discharge Disposition:  Discharge to SNF


Discharge Instructions


DIET: Follow Instructions for:  As Tolerated, No Restrictions


Activities you can perform:  Regular-No Restrictions


Follow up Referrals:  


Orthopedics - 2 Weeks @ [facility] with Ovidio Xavier MD





New Medications:  


Calcium Carbonate/Vitamin D3 (Calcium 600 + Vit D Tablet) 1 Each Tablet


1 TAB PO BID Calcium Supplement #60 TAB


Ergocalciferol (Ergocalciferol) 50,000 Unit Cap


85864 UNITS PO Q7D Nutritional Supplement #8 Ref 0 CAP


Hydrocodone-Acetaminophen (Norco) 5-325 mg Tab


1 TAB PO Q4H PRN PAIN #50 Ref 0 TAB


Rivaroxaban (Xarelto) 10 Mg Tab


10 MG PO DAILY Blood Clot Prevention #14 Ref 0 TAB


Acetaminophen (Eq Acetaminophen) 325 Mg Tab


650 MG PO Q4H PRN FEVER #30 TAB


Bisacodyl DR (Bisacodyl EC) 5 Mg Tabec


10 MG PO BID Constipation #30 TAB


Calcium/Vitamin D (Oyster Shell 250 mg + Vit D Tb) 250 Mg Calcium (625 Mg)-125 

Unit Tablet


250 MG PO TID Electrolyte Replacement #90 BOTTLE


Cholecalciferol (Vitamin D3) 5,000 Unit Cap


5000 UNITS PO DAILY Electrolyte Replacement #30 CAP


Digoxin (Digoxin) 0.125 Mg Tab


0.125 MG PO DAILY Regulate Heart Beat #30 TAB


Docusate Sodium (Dok) 100 Mg Cap


100 MG PO BID Constipation #30 CAP


 


Continued Medications:  


Quetiapine (Quetiapine) 50 Mg Tab


50 MG PO HS #30 Ref 0 TAB











Gellermann,Diane M. [facility] [place] Jul 7, 2017 10:13

## 2018-03-02 ENCOUNTER — HOSPITAL ENCOUNTER (INPATIENT)
Dept: HOSPITAL 17 - PHED | Age: 83
LOS: 6 days | Discharge: NURSING, REHAB OR LONG-TERM CARE | DRG: 481 | End: 2018-03-08
Attending: FAMILY MEDICINE | Admitting: FAMILY MEDICINE
Payer: MEDICARE

## 2018-03-02 VITALS
DIASTOLIC BLOOD PRESSURE: 103 MMHG | TEMPERATURE: 98.5 F | SYSTOLIC BLOOD PRESSURE: 128 MMHG | RESPIRATION RATE: 24 BRPM | OXYGEN SATURATION: 97 % | HEART RATE: 106 BPM

## 2018-03-02 VITALS — DIASTOLIC BLOOD PRESSURE: 70 MMHG | OXYGEN SATURATION: 96 % | SYSTOLIC BLOOD PRESSURE: 111 MMHG | HEART RATE: 108 BPM

## 2018-03-02 VITALS — HEART RATE: 96 BPM | RESPIRATION RATE: 22 BRPM | OXYGEN SATURATION: 94 %

## 2018-03-02 VITALS
DIASTOLIC BLOOD PRESSURE: 64 MMHG | SYSTOLIC BLOOD PRESSURE: 140 MMHG | HEART RATE: 89 BPM | RESPIRATION RATE: 20 BRPM | OXYGEN SATURATION: 97 %

## 2018-03-02 VITALS — BODY MASS INDEX: 17.7 KG/M2 | HEIGHT: 60 IN | WEIGHT: 90.17 LBS

## 2018-03-02 DIAGNOSIS — N39.0: ICD-10-CM

## 2018-03-02 DIAGNOSIS — S72.142A: Primary | ICD-10-CM

## 2018-03-02 DIAGNOSIS — I10: ICD-10-CM

## 2018-03-02 DIAGNOSIS — I47.1: ICD-10-CM

## 2018-03-02 DIAGNOSIS — D64.9: ICD-10-CM
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
## 2018-03-02 DIAGNOSIS — E78.5: ICD-10-CM

## 2018-03-02 DIAGNOSIS — F02.80: ICD-10-CM

## 2018-03-02 DIAGNOSIS — G30.9: ICD-10-CM

## 2018-03-02 DIAGNOSIS — B96.1: ICD-10-CM

## 2018-03-02 LAB
ALBUMIN SERPL-MCNC: 3.5 GM/DL (ref 3.4–5)
ALP SERPL-CCNC: 101 U/L (ref 45–117)
ALT SERPL-CCNC: 12 U/L (ref 10–53)
AST SERPL-CCNC: 15 U/L (ref 15–37)
BACTERIA #/AREA URNS HPF: (no result) /HPF
BASOPHILS # BLD AUTO: 0.1 TH/MM3 (ref 0–0.2)
BASOPHILS NFR BLD: 0.6 % (ref 0–2)
BILIRUB SERPL-MCNC: 0.4 MG/DL (ref 0.2–1)
BUN SERPL-MCNC: 18 MG/DL (ref 7–18)
CALCIUM SERPL-MCNC: 9.1 MG/DL (ref 8.5–10.1)
CHLORIDE SERPL-SCNC: 102 MEQ/L (ref 98–107)
COLOR UR: YELLOW
CREAT SERPL-MCNC: 1.1 MG/DL (ref 0.5–1)
EOSINOPHIL # BLD: 0 TH/MM3 (ref 0–0.4)
EOSINOPHIL NFR BLD: 0.2 % (ref 0–4)
ERYTHROCYTE [DISTWIDTH] IN BLOOD BY AUTOMATED COUNT: 12.3 % (ref 11.6–17.2)
GFR SERPLBLD BASED ON 1.73 SQ M-ARVRAT: 47 ML/MIN (ref 89–?)
GLUCOSE SERPL-MCNC: 262 MG/DL (ref 74–106)
GLUCOSE UR STRIP-MCNC: (no result) MG/DL
HCO3 BLD-SCNC: 22.2 MEQ/L (ref 21–32)
HCT VFR BLD CALC: 39.1 % (ref 35–46)
HGB BLD-MCNC: 13.1 GM/DL (ref 11.6–15.3)
HGB UR QL STRIP: (no result)
INR PPP: 1 RATIO
KETONES UR STRIP-MCNC: (no result) MG/DL
LEUKOCYTE ESTERASE UR QL STRIP: (no result) /HPF (ref 0–5)
LYMPHOCYTES # BLD AUTO: 0.8 TH/MM3 (ref 1–4.8)
LYMPHOCYTES NFR BLD AUTO: 3.5 % (ref 9–44)
MCH RBC QN AUTO: 28.7 PG (ref 27–34)
MCHC RBC AUTO-ENTMCNC: 33.4 % (ref 32–36)
MCV RBC AUTO: 85.9 FL (ref 80–100)
MONOCYTE #: 0.6 TH/MM3 (ref 0–0.9)
MONOCYTES NFR BLD: 2.4 % (ref 0–8)
NEUTROPHILS # BLD AUTO: 21.7 TH/MM3 (ref 1.8–7.7)
NEUTROPHILS NFR BLD AUTO: 93.3 % (ref 16–70)
NITRITE UR QL STRIP: (no result)
PLATELET # BLD: 320 TH/MM3 (ref 150–450)
PMV BLD AUTO: 9.3 FL (ref 7–11)
PROT SERPL-MCNC: 7.5 GM/DL (ref 6.4–8.2)
PROTHROMBIN TIME: 10.5 SEC (ref 9.8–11.6)
RBC # BLD AUTO: 4.56 MIL/MM3 (ref 4–5.3)
SODIUM SERPL-SCNC: 138 MEQ/L (ref 136–145)
SP GR UR STRIP: (no result) (ref 1–1.03)
SQUAMOUS #/AREA URNS HPF: (no result) /HPF (ref 0–5)
URINE LEUKOCYTE ESTERASE: (no result)
WBC # BLD AUTO: 23.2 TH/MM3 (ref 4–11)

## 2018-03-02 PROCEDURE — 73502 X-RAY EXAM HIP UNI 2-3 VIEWS: CPT

## 2018-03-02 PROCEDURE — 85027 COMPLETE CBC AUTOMATED: CPT

## 2018-03-02 PROCEDURE — 86850 RBC ANTIBODY SCREEN: CPT

## 2018-03-02 PROCEDURE — 70450 CT HEAD/BRAIN W/O DYE: CPT

## 2018-03-02 PROCEDURE — 81001 URINALYSIS AUTO W/SCOPE: CPT

## 2018-03-02 PROCEDURE — 93005 ELECTROCARDIOGRAM TRACING: CPT

## 2018-03-02 PROCEDURE — 76000 FLUOROSCOPY <1 HR PHYS/QHP: CPT

## 2018-03-02 PROCEDURE — 80053 COMPREHEN METABOLIC PANEL: CPT

## 2018-03-02 PROCEDURE — 87186 SC STD MICRODIL/AGAR DIL: CPT

## 2018-03-02 PROCEDURE — 85610 PROTHROMBIN TIME: CPT

## 2018-03-02 PROCEDURE — P9016 RBC LEUKOCYTES REDUCED: HCPCS

## 2018-03-02 PROCEDURE — 85025 COMPLETE CBC W/AUTO DIFF WBC: CPT

## 2018-03-02 PROCEDURE — 36430 TRANSFUSION BLD/BLD COMPNT: CPT

## 2018-03-02 PROCEDURE — 87077 CULTURE AEROBIC IDENTIFY: CPT

## 2018-03-02 PROCEDURE — 85730 THROMBOPLASTIN TIME PARTIAL: CPT

## 2018-03-02 PROCEDURE — 80048 BASIC METABOLIC PNL TOTAL CA: CPT

## 2018-03-02 PROCEDURE — 72125 CT NECK SPINE W/O DYE: CPT

## 2018-03-02 PROCEDURE — 82550 ASSAY OF CK (CPK): CPT

## 2018-03-02 PROCEDURE — 71045 X-RAY EXAM CHEST 1 VIEW: CPT

## 2018-03-02 PROCEDURE — 86900 BLOOD TYPING SEROLOGIC ABO: CPT

## 2018-03-02 PROCEDURE — 85007 BL SMEAR W/DIFF WBC COUNT: CPT

## 2018-03-02 PROCEDURE — C1713 ANCHOR/SCREW BN/BN,TIS/BN: HCPCS

## 2018-03-02 PROCEDURE — 86920 COMPATIBILITY TEST SPIN: CPT

## 2018-03-02 PROCEDURE — 86901 BLOOD TYPING SEROLOGIC RH(D): CPT

## 2018-03-02 PROCEDURE — 87086 URINE CULTURE/COLONY COUNT: CPT

## 2018-03-02 RX ADMIN — PHENYTOIN SODIUM SCH MLS/HR: 50 INJECTION INTRAMUSCULAR; INTRAVENOUS at 22:58

## 2018-03-02 NOTE — RADRPT
EXAM DATE/TIME:  03/02/2018 20:27 

 

[facility] COMPARISON:     

CHEST SINGLE AP, June 29, 2017, 15:23.

 

                     

INDICATIONS :     

Chest pain. 

                     

 

MEDICAL HISTORY :     

Hypercholesterolemia.  Osteoporosis.  Hypertension.   Dementia, Alzheimer's disease, irregular heartb
eat, anxiety, osteopenia, right femur fracture   

 

SURGICAL HISTORY :        

ORIF right femur

 

ENCOUNTER:     

Initial                                        

 

ACUITY:     

1 day      

 

PAIN SCORE:     

6/10

 

LOCATION:     

Bilateral chest 

 

FINDINGS:     

A single view of the chest demonstrates the lungs to be symmetrically aerated without evidence of mas
s, infiltrate or effusion.  The cardiomediastinal contours are unremarkable.  Osseous structures are 
intact.

 

CONCLUSION:     No acute disease.  

 

 

 

 Tomas Bang MD on March 02, 2018 at 21:08           

Board Certified Radiologist.

 This report was verified electronically.

## 2018-03-02 NOTE — RADRPT
EXAM DATE/TIME:  03/02/2018 20:27 

 

[facility] COMPARISON:     

HIP LEFT (AP&LAT 2/3VWS) W AP PELVIS, June 29, 2017, 15:09.

 

                     

INDICATIONS :     

Left hip pain post fall. 

                     

 

MEDICAL HISTORY :     

Hypercholesterolemia.  Osteoporosis.  Hypertension.   Dementia, Alzheimer's disease, irregular heartb
eat, anxiety, osteopenia, right femur fracture   

 

SURGICAL HISTORY :        

ORIF right femur

 

ENCOUNTER:     

Initial                                        

 

ACUITY:     

1 day      

 

PAIN SCORE:     

9/10

 

LOCATION:     

Left pelvis 

 

FINDINGS:     

There is an acute displaced comminuted intertrochanteric fracture of the left proximal femur.

 

CONCLUSION:     Acute displaced comminuted intertrochanteric fracture of the left proximal femur. 

 

 

 Tomas Bang MD on March 02, 2018 at 21:05           

Board Certified Radiologist.

 This report was verified electronically.

## 2018-03-02 NOTE — RADRPT
EXAM DATE/TIME:  03/02/2018 21:27 

 

[facility] COMPARISON:     

CT BRAIN W/O CONTRAST, June 29, 2017, 15:23.

 

 

INDICATIONS :     

Trauma; fall.

                      

 

RADIATION DOSE:     

57.37 CTDIvol (mGy) 

 

 

 

MEDICAL HISTORY :     

Cardiovascular disease. Alzheimer's. Hypertension.

 

SURGICAL HISTORY :      

Appendectomy. 

 

ENCOUNTER:      

Initial

 

ACUITY:      

1 day

 

PAIN SCALE:      

5/10

 

LOCATION:        

cranial 

 

TECHNIQUE:     

Multiple contiguous axial images were obtained of the head.  Using automated exposure control and adj
ustment of the mA and/or kV according to patient size, radiation dose was kept as low as reasonably a
chievable to obtain optimal diagnostic quality images.   DICOM format image data is available electro
nically for review and comparison.  

 

FINDINGS:     

Diffuse cerebral atrophy is noted. Moderate periventricular and subcortical white matter small vessel
 ischemic changes are noted bilaterally. There is no acute infarct, acute hemorrhage, midline shift o
r extra axial fluid collections.

 

CONCLUSION:     Diffuse cerebral atrophy. Moderate periventricular and subcortical white matter small
 vessel symptoms bilaterally. No acute infarct, acute hemorrhage, midline shift or extra axial fluid 
collections. 

 

 

 Tomas Bang MD on March 02, 2018 at 21:48           

Board Certified Radiologist.

 This report was verified electronically.

## 2018-03-02 NOTE — RADRPT
EXAM DATE/TIME:  03/02/2018 21:27 

 

[facility] COMPARISON:     

CT CERVICAL SPINE W/O CONTRAST, June 29, 2017, 15:23.

 

 

INDICATIONS :     

Trauma; fall.

                      

 

RADIATION DOSE:     

26.31 CTDIvol (mGy) 

 

 

 

MEDICAL HISTORY :     

Hypertension. Alzheimer's. Cardiovascular disease

 

SURGICAL HISTORY :      

Appendectomy. 

 

ENCOUNTER:      

Initial

 

ACUITY:      

1 day

 

PAIN SCALE:      

5/10

 

LOCATION:       

Bilateral neck 

 

TECHNIQUE:     

Volumetric scanning of the cervical spine was performed. Multiplanar reconstructions in the sagittal,
 coronal and oblique axial planes were performed.   Using automated exposure control and adjustment o
f the mA and/or kV according to patient size, radiation dose was kept as low as reasonably achievable
 to obtain optimal diagnostic quality images.   DICOM format image data is available electronically f
or review and comparison.  

 

FINDINGS:     

No acute fracture or prevertebral soft tissue swelling is noted. Diffuse cervical spondylosis is note
d at all levels. Grade I retrolisthesis of C4 in relation to C3 is noted. Grade I anterolisthesis of 
C7 in relation to T1 is also noted. Moderate spinal stenosis and bilateral foraminal narrowing is aga
in noted at C4-5. Mild spinal stenosis is noted at C5-6 and C6-7. Moderate bilateral foraminal narrow
ing is noted at C3-4, C5-6 and C6-7. The bony relationship alignment between C1 and C2 is well mainta
ined. Scoliosis of the cervical spine is noted. 

 

 

CONCLUSION:    

No acute fracture or prevertebral soft tissue swelling.      Moderate spinal stenosis at C4-5 and mil
d spinal stenosis at C5-6 and C6-7. Moderate bilateral foraminal narrowing at C3-4, C4-5, C5-6 and C6
-7. Grade I retrolisthesis of C4 in relation to C3. Grade I anterolisthesis of C7 in relation to T1. 


 

 

 Tomas Bang MD on March 02, 2018 at 22:02           

Board Certified Radiologist.

 This report was verified electronically.

## 2018-03-02 NOTE — PD
HPI


Chief Complaint:  Pain: Acute or Chronic


Time Seen by Provider:  20:18


Travel History


International Travel<30 days:  No


Contact w/Intl Traveler<30days:  No


Traveled to known affect area:  No





History of Present Illness


HPI


Patient is an 85-year-old female with a history of dense dementia, rarely talks 

at baseline but is ambulatory presents emergency department by EMS for left hip 

pain.  According to EMS the patient normally ambulates but had been seen 

regarding her left hip tonight so family brought her into be seen.  Family 

arrives later and states that yes the patient does indeed normally walk, her 

caregiver who is with her 24 hours a day states that she was last seen 

ambulating about noon but they noticed that she started having guarding of her 

left hip around 4:00.  Unclear as to any injury pattern she might of had, no 

falls were witnessed today.  The patient is unable to provide any history given 

her dementia.





PFSH


Past Medical History


Alzheimer's Disease:  Yes


Anxiety:  Yes


Heart Rhythm Problems:  Yes (SVT)


Cardiovascular Problems:  Yes (MVR)


High Cholesterol:  Yes


Congestive Heart Failure:  No


Dementia:  Yes


Diminished Hearing:  No


Endocrine:  No


Gastrointestinal Disorders:  No


Genitourinary:  No


Hypertension:  Yes


Implanted Vascular Access Dvce:  No


Medical other:  Yes (OSTEOPENIA)


Musculoskeletal:  No


Neurologic:  No


Reproductive:  No


Respiratory:  No


Immunizations Current:  Yes


Tetanus Vaccination:  > 5 Years


Influenza Vaccination:  Yes


PNEUMOCCOCAL Vaccine (Year):  1


Pregnant?:  Not Pregnant


Menopausal:  Yes


:  3


Para:  3





Past Surgical History


Abdominal Surgery:  No


Appendectomy:  Yes


Cardiac Surgery:  No


Ear Surgery:  No


Endocrine Surgery:  No


Eye Surgery:  Yes (CATARACT)


Genitourinary Surgery:  No


Gynecologic Surgery:  No


Oral Surgery:  Yes (Teeth pulled )


Thoracic Surgery:  No


Other Surgery:  No





Social History


Alcohol Use:  No


Tobacco Use:  No


Substance Use:  No





Allergies-Medications


(Allergen,Severity, Reaction):  


Coded Allergies:  


     epinephrine (Unverified  Adverse Reaction, Severe, Palpitations , 3/2/18)


     lidocaine (Unverified  Adverse Reaction, Severe, Palpitations , 3/2/18)


Reported Meds & Prescriptions





Reported Meds & Active Scripts


Active


Reported


Digoxin 0.125 Mg Tab 0.125 Mg PO DAILY


Quetiapine (Quetiapine Fumarate) 50 Mg Tab 50 Mg PO HS








Review of Systems


ROS Limitations:  Altered Mental Status (Dementia)





Physical Exam


Narrative


GENERAL: Well-developed well-nourished, appears uncomfortable.


SKIN: Focused skin assessment warm/dry.


HEAD: Atraumatic. Normocephalic. 


EYES: Pupils equal and round. No scleral icterus. No injection or drainage. 


ENT: No nasal bleeding or discharge.  Mucous membranes pink and moist.


NECK: Trachea midline. No JVD. 


CARDIOVASCULAR: Regular rate and rhythm.  No murmur appreciated.


RESPIRATORY: No accessory muscle use. Clear to auscultation. Breath sounds 

equal bilaterally. 


GASTROINTESTINAL: Abdomen soft, non-tender, nondistended. Hepatic and splenic 

margins not palpable. 


MUSCULOSKELETAL: No clubbing.  No cyanosis.  No edema.  There is obvious 

shortening and external rotation of the left hip, tenderness with internal and 

external rotation and some small hematoma that is for up subcutaneously.   the 

compartments are soft, she has 2+ bilateral equal pulses in all 4 extremities, 

she is moving her toes but does not follow commands.


NEUROLOGICAL: Awake and alert. No obvious cranial nerve deficits.  Moves all 4 

extremities, does not participate in cranial nerve exam.





Data


Data


Last Documented VS





Vital Signs








  Date Time  Temp Pulse Resp B/P (MAP) Pulse Ox O2 Delivery O2 Flow Rate FiO2


 


3/2/18 21:57  96 22  94 Room Air  


 


3/2/18 20:17 98.5       








Orders





 Orders


Electrocardiogram (3/2/18 20:18)


Complete Blood Count With Diff (3/2/18 20:18)


Comprehensive Metabolic Panel (3/2/18 20:18)


Prothrombin Time / Inr (Pt) (3/2/18 20:18)


Act Partial Throm Time (Ptt) (3/2/18 20:18)


Chest, Single Ap (3/2/18 20:18)


Ecg Monitoring (3/2/18 20:18)


Iv Access Insert/Monitor (3/2/18 20:18)


Oximetry (3/2/18 20:18)


Oxygen Administration (3/2/18 20:18)


Morphine Inj (Morphine Inj) (3/2/18 20:30)


Sodium Chloride 0.9% Flush (Ns Flush) (3/2/18 20:30)


Hip, Uni(Ap&Lat) W Ap Pelvis (3/2/18 )


Type And Screen (3/2/18 20:22)


Ct Brain W/O Iv Contrast(Rout) (3/2/18 )


Ct Cerv Spine W/O Contrast (3/2/18 )


Urinary Catheter Management PAMELA.Q8H (3/2/18 21:30)


Consult Orthopedic (3/2/18 )


Urinalysis - C+S If Indicated (3/2/18 21:37)


Urine Culture (3/2/18 21:20)


Ceftriaxone Inj (Rocephin Inj) (3/2/18 22:15)


Admit Order (Ed Use Only) (3/2/18 )





Labs





Laboratory Tests








Test


  3/2/18


20:28 3/2/18


21:20


 


White Blood Count 23.2 TH/MM3  


 


Red Blood Count 4.56 MIL/MM3  


 


Hemoglobin 13.1 GM/DL  


 


Hematocrit 39.1 %  


 


Mean Corpuscular Volume 85.9 FL  


 


Mean Corpuscular Hemoglobin 28.7 PG  


 


Mean Corpuscular Hemoglobin


Concent 33.4 % 


  


 


 


Red Cell Distribution Width 12.3 %  


 


Platelet Count 320 TH/MM3  


 


Mean Platelet Volume 9.3 FL  


 


Neutrophils (%) (Auto) 93.3 %  


 


Lymphocytes (%) (Auto) 3.5 %  


 


Monocytes (%) (Auto) 2.4 %  


 


Eosinophils (%) (Auto) 0.2 %  


 


Basophils (%) (Auto) 0.6 %  


 


Neutrophils # (Auto) 21.7 TH/MM3  


 


Lymphocytes # (Auto) 0.8 TH/MM3  


 


Monocytes # (Auto) 0.6 TH/MM3  


 


Eosinophils # (Auto) 0.0 TH/MM3  


 


Basophils # (Auto) 0.1 TH/MM3  


 


CBC Comment DIFF FINAL  


 


Differential Comment   


 


Prothrombin Time 10.5 SEC  


 


Prothromb Time International


Ratio 1.0 RATIO 


  


 


 


Activated Partial


Thromboplast Time 21.8 SEC 


  


 


 


Blood Urea Nitrogen 18 MG/DL  


 


Creatinine 1.10 MG/DL  


 


Random Glucose 262 MG/DL  


 


Total Protein 7.5 GM/DL  


 


Albumin 3.5 GM/DL  


 


Calcium Level 9.1 MG/DL  


 


Alkaline Phosphatase 101 U/L  


 


Aspartate Amino Transf


(AST/SGOT) 15 U/L 


  


 


 


Alanine Aminotransferase


(ALT/SGPT) 12 U/L 


  


 


 


Total Bilirubin 0.4 MG/DL  


 


Sodium Level 138 MEQ/L  


 


Potassium Level 4.1 MEQ/L  


 


Chloride Level 102 MEQ/L  


 


Carbon Dioxide Level 22.2 MEQ/L  


 


Anion Gap 14 MEQ/L  


 


Estimat Glomerular Filtration


Rate 47 ML/MIN 


  


 


 


Urine Color  YELLOW 


 


Urine Turbidity  CLOUDY 


 


Urine pH  5.0 


 


Urine Specific Gravity


  


  GREATER/EQUAL


1.030


 


Urine Protein  NEG mg/dL 


 


Urine Glucose (UA)  NEG mg/dL 


 


Urine Ketones  NEG mg/dL 


 


Urine Occult Blood  MOD 


 


Urine Nitrite  NEG 


 


Urine Bilirubin  NEG 


 


Urine Urobilinogen  0.2 MG/DL 


 


Urine Leukocyte Esterase  NEG 


 


Urine WBC  0-2 /hpf 


 


Urine Squamous Epithelial


Cells 


  0-5 /hpf 


 


 


Urine Bacteria  MANY /hpf 


 


Microscopic Urinalysis Comment


  


  CULTURE


INDICATED











MDM


Medical Decision Making


Medical Screen Exam Complete:  Yes


Emergency Medical Condition:  Yes


Differential Diagnosis


Hip fracture, femur fracture, head injury, neck injury, fall, dementia, urinary 

tract infection peer


Narrative Course


Patient was room to the emergency department, she does appear comfortable and 

has an obvious left hip fracture, x-ray confirms an intertrochanteric left hip 

fracture.





Patient discussed with Dr. Saenz who requests transfer to the [facility] hospital 

for operative intervention.  This was discussed with the family.





While the patient's family and caregiver do seem very loving at the bedside I 

cannot obtain a mechanism of injury by history and she apparently is supposed 

to have an assigned caregiver at the bedside at all times.  For this reason I 

have filed with [facility] as I am a mandatory  though I do have a very low 

index suspicion that this is an abuse case.





She does have a leukocytosis 22,000 which could be from pain or could be from 

infection, she does have some bacteria in her urine and dose of Rocephin was 

given.  Her vital signs have normalized after morphine was given.  She 

therefore only has 1 sirs criteria





CT head and C-spine were also performed which were negative





Patient discussed with Dr. Saldana's PA, will be transferred to the [facility] 

hospital.





Diagnosis





 Primary Impression:  


 Fracture, proximal femur


 Additional Impression:  


 UTI (urinary tract infection)





Admitting Information


Admitting Physician Requests:  Admit


Condition:  Stable











Tomas Solorzano MD Mar 2, 2018 20:46

## 2018-03-03 VITALS
HEART RATE: 97 BPM | DIASTOLIC BLOOD PRESSURE: 69 MMHG | TEMPERATURE: 98.2 F | RESPIRATION RATE: 18 BRPM | SYSTOLIC BLOOD PRESSURE: 179 MMHG | OXYGEN SATURATION: 94 %

## 2018-03-03 VITALS
OXYGEN SATURATION: 97 % | DIASTOLIC BLOOD PRESSURE: 78 MMHG | RESPIRATION RATE: 18 BRPM | SYSTOLIC BLOOD PRESSURE: 129 MMHG | HEART RATE: 90 BPM | TEMPERATURE: 97.8 F

## 2018-03-03 VITALS
DIASTOLIC BLOOD PRESSURE: 91 MMHG | RESPIRATION RATE: 19 BRPM | TEMPERATURE: 96.4 F | SYSTOLIC BLOOD PRESSURE: 116 MMHG | HEART RATE: 92 BPM | OXYGEN SATURATION: 96 %

## 2018-03-03 VITALS
HEART RATE: 74 BPM | OXYGEN SATURATION: 98 % | TEMPERATURE: 97.4 F | DIASTOLIC BLOOD PRESSURE: 69 MMHG | SYSTOLIC BLOOD PRESSURE: 141 MMHG | RESPIRATION RATE: 18 BRPM

## 2018-03-03 VITALS
SYSTOLIC BLOOD PRESSURE: 159 MMHG | DIASTOLIC BLOOD PRESSURE: 68 MMHG | HEART RATE: 96 BPM | RESPIRATION RATE: 18 BRPM | TEMPERATURE: 96.8 F | OXYGEN SATURATION: 95 %

## 2018-03-03 VITALS — DIASTOLIC BLOOD PRESSURE: 74 MMHG | SYSTOLIC BLOOD PRESSURE: 158 MMHG

## 2018-03-03 VITALS
TEMPERATURE: 98 F | SYSTOLIC BLOOD PRESSURE: 140 MMHG | HEART RATE: 78 BPM | DIASTOLIC BLOOD PRESSURE: 70 MMHG | RESPIRATION RATE: 18 BRPM | OXYGEN SATURATION: 95 %

## 2018-03-03 VITALS — DIASTOLIC BLOOD PRESSURE: 70 MMHG | SYSTOLIC BLOOD PRESSURE: 152 MMHG

## 2018-03-03 LAB
BASOPHILS # BLD AUTO: 0 TH/MM3 (ref 0–0.2)
BASOPHILS NFR BLD: 0.1 % (ref 0–2)
BUN SERPL-MCNC: 20 MG/DL (ref 7–18)
CALCIUM SERPL-MCNC: 8.4 MG/DL (ref 8.5–10.1)
CHLORIDE SERPL-SCNC: 109 MEQ/L (ref 98–107)
CREAT SERPL-MCNC: 0.79 MG/DL (ref 0.5–1)
EOSINOPHIL # BLD: 0 TH/MM3 (ref 0–0.4)
EOSINOPHIL NFR BLD: 0 % (ref 0–4)
ERYTHROCYTE [DISTWIDTH] IN BLOOD BY AUTOMATED COUNT: 13.2 % (ref 11.6–17.2)
GFR SERPLBLD BASED ON 1.73 SQ M-ARVRAT: 69 ML/MIN (ref 89–?)
GLUCOSE SERPL-MCNC: 159 MG/DL (ref 74–106)
HCO3 BLD-SCNC: 27.4 MEQ/L (ref 21–32)
HCT VFR BLD CALC: 29.6 % (ref 35–46)
HGB BLD-MCNC: 10.1 GM/DL (ref 11.6–15.3)
LYMPHOCYTES # BLD AUTO: 1 TH/MM3 (ref 1–4.8)
LYMPHOCYTES NFR BLD AUTO: 7.7 % (ref 9–44)
MCH RBC QN AUTO: 29.3 PG (ref 27–34)
MCHC RBC AUTO-ENTMCNC: 34 % (ref 32–36)
MCV RBC AUTO: 86.1 FL (ref 80–100)
MONOCYTE #: 1.2 TH/MM3 (ref 0–0.9)
MONOCYTES NFR BLD: 9.6 % (ref 0–8)
NEUTROPHILS # BLD AUTO: 10.4 TH/MM3 (ref 1.8–7.7)
NEUTROPHILS NFR BLD AUTO: 82.6 % (ref 16–70)
PLATELET # BLD: 232 TH/MM3 (ref 150–450)
PMV BLD AUTO: 9.6 FL (ref 7–11)
RBC # BLD AUTO: 3.44 MIL/MM3 (ref 4–5.3)
SODIUM SERPL-SCNC: 142 MEQ/L (ref 136–145)
WBC # BLD AUTO: 12.6 TH/MM3 (ref 4–11)

## 2018-03-03 RX ADMIN — PHENYTOIN SODIUM SCH MLS/HR: 50 INJECTION INTRAMUSCULAR; INTRAVENOUS at 08:04

## 2018-03-03 RX ADMIN — STANDARDIZED SENNA CONCENTRATE AND DOCUSATE SODIUM SCH TAB: 8.6; 5 TABLET, FILM COATED ORAL at 08:03

## 2018-03-03 RX ADMIN — STANDARDIZED SENNA CONCENTRATE AND DOCUSATE SODIUM SCH TAB: 8.6; 5 TABLET, FILM COATED ORAL at 20:53

## 2018-03-03 RX ADMIN — Medication SCH ML: at 08:04

## 2018-03-03 RX ADMIN — PHENYTOIN SODIUM SCH MLS/HR: 50 INJECTION INTRAMUSCULAR; INTRAVENOUS at 20:53

## 2018-03-03 RX ADMIN — MORPHINE SULFATE PRN MG: 2 INJECTION, SOLUTION INTRAMUSCULAR; INTRAVENOUS at 14:39

## 2018-03-03 RX ADMIN — DIGOXIN SCH MG: 125 TABLET ORAL at 08:03

## 2018-03-03 RX ADMIN — Medication SCH ML: at 20:53

## 2018-03-03 RX ADMIN — MORPHINE SULFATE PRN MG: 2 INJECTION, SOLUTION INTRAMUSCULAR; INTRAVENOUS at 03:36

## 2018-03-03 NOTE — HHI.HP
History of Present Illness


Service


Family Pracice


Primary Care Physician


Tank Santacruz DO


Admission Diagnosis





Left Femur fracture.


Diagnoses:  


(1) Fracture, proximal femur


Diagnosis:  Principal





(2) Alzheimer disease


Diagnosis:  Secondary





(3) UTI (urinary tract infection)


Diagnosis:  Principal





History of Present Illness


Patient was noted by family to be limping and was brought to [facility] and 

found to have a left intertrochanteric hip fracture and was transferred here 

for surgical intervention.


Sepsis Criteria


SIRS Criteria (2 or more):  WBC > 49946, < 4000 or > 10% bands





Review of Systems


ROS Limitations:  Clinical Condition (Unable to communicate at this time due to 

Alzheimers dementia), Altered Mental Status, Other





Past Family Social History


Allergies:  


Coded Allergies:  


     epinephrine (Unverified  Adverse Reaction, Severe, Palpitations , 3/2/18)


     lidocaine (Unverified  Adverse Reaction, Severe, Palpitations , 3/2/18)


Past Medical History


Alzheimer dementia;


H/O SVT;


MVR;


HPLD;


HTN


Past Surgical History


Appy;


Cataract surgery


Reported Medications





Current Medications








 Medications


  (Trade)  Dose


 Ordered  Sig/Sukhi


 Route  Start Time


 Stop Time Status Last Admin


 


  (NS Flush)  2 ml  UNSCH  PRN


 IV FLUSH  3/2/18 22:15


     


 


 


  (NS Flush)  2 ml  BID


 IV FLUSH  3/3/18 09:00


     


 


 


  (Tylenol)  650 mg  Q4H  PRN


 PO  3/2/18 22:15


     


 


 


  (Zofran Inj)  4 mg  Q6H  PRN


 IVP  3/2/18 22:15


     


 


 


  (Narcan Inj)  0.4 mg  UNSCH  PRN


 IV PUSH  3/2/18 22:15


     


 


 


  (Yeny-Colace)  1 tab  BID


 PO  3/3/18 09:00


     


 


 


  (Milk Of


 Magnesia Liq)  30 ml  Q12H  PRN


 PO  3/2/18 22:15


     


 


 


  (Senokot)  17.2 mg  Q12H  PRN


 PO  3/2/18 22:15


     


 


 


  (Dulcolax Supp)  10 mg  DAILY  PRN


 RECTAL  3/2/18 22:15


     


 


 


  (Lactulose Liq)  30 ml  DAILY  PRN


 PO  3/2/18 22:15


     


 


 


 Sodium Chloride  1,000 ml @ 


 100 mls/hr  Q10H


 IV  3/2/18 22:15


    3/2/18 22:58


 


 


  (Morphine Inj)  2 mg  Q3H  PRN


 IV PUSH  3/2/18 22:30


    3/3/18 03:36


 


 


  (Lanoxin)  0.125 mg  DAILY


 PO  3/3/18 09:00


     


 


 


  (SEROquel)  50 mg  HS


 PO  3/3/18 21:00


     


 


 


  (Reglan Inj)  5 mg  Q6H  PRN


 IV PUSH  3/2/18 23:00


     


 








Family History


Unable to obtain due to dementia. No family in attendance at this time.


Social History


No H/;O ETOH or Cigs per record





Physical Exam


Vital Signs





Vital Signs








  Date Time  Temp Pulse Resp B/P (MAP) Pulse Ox O2 Delivery O2 Flow Rate FiO2


 


3/3/18 05:44 98.0 78 18 140/70 (93) 95   


 


3/3/18 03:41   16     


 


3/3/18 00:45 97.4 74 18 141/69 (93) 98   


 


3/3/18 00:11        


 


3/2/18 23:01  89 20 140/64 (89) 97 Room Air  


 


3/2/18 21:57  96 22  94 Room Air  


 


3/2/18 21:00  109 24     


 


3/2/18 21:00  108  111/70 (84) 96 Room Air  


 


3/2/18 20:17 98.5 106 24 128/103 (111) 97   








Physical Exam


GENERAL: This is a thin, well-developed patient, in mild apparent distress due 

to hip pain.


SKIN: No rashes, ecchymoses or lesions. Cool and dry.


HEAD: Atraumatic. Normocephalic. No temporal or scalp tenderness.


EYES: Pupils equal round and reactive. Extraocular motions intact. No scleral 

icterus. No injection or drainage. 


ENT: Nose without bleeding, purulent drainage or septal hematoma. Throat 

without erythema, tonsillar hypertrophy or exudate. Uvula midline. Airway 

patent.


NECK: Trachea midline. No JVD or lymphadenopathy. Supple, nontender, no 

meningeal signs.


CARDIOVASCULAR: Regular rate and rhythm without murmurs, gallops, or rubs. 


RESPIRATORY: Clear to auscultation. Breath sounds equal bilaterally. No wheezes

, rales, or rhonchi.  


GASTROINTESTINAL: Abdomen soft, non-tender, nondistended. No hepato-splenomegaly

, or palpable masses. No guarding.


MUSCULOSKELETAL: Shortening of the heft lower extremity with external rotation 

and no AROM due to pain. NV is intact distally.


NEUROLOGICAL: Awake and responds to touch, but nonverbal during this encounter.


Laboratory





Laboratory Tests








Test


  3/2/18


20:28 3/2/18


21:20 3/3/18


06:16


 


White Blood Count 23.2   


 


Red Blood Count 4.56   


 


Hemoglobin 13.1   


 


Hematocrit 39.1   


 


Mean Corpuscular Volume 85.9   


 


Mean Corpuscular Hemoglobin 28.7   


 


Mean Corpuscular Hemoglobin


Concent 33.4 


  


  


 


 


Red Cell Distribution Width 12.3   


 


Platelet Count 320   


 


Mean Platelet Volume 9.3   


 


Neutrophils (%) (Auto) 93.3   


 


Lymphocytes (%) (Auto) 3.5   


 


Monocytes (%) (Auto) 2.4   


 


Eosinophils (%) (Auto) 0.2   


 


Basophils (%) (Auto) 0.6   


 


Neutrophils # (Auto) 21.7   


 


Lymphocytes # (Auto) 0.8   


 


Monocytes # (Auto) 0.6   


 


Eosinophils # (Auto) 0.0   


 


Basophils # (Auto) 0.1   


 


CBC Comment DIFF FINAL   


 


Differential Comment    


 


Prothrombin Time 10.5   


 


Prothromb Time International


Ratio 1.0 


  


  


 


 


Activated Partial


Thromboplast Time 21.8 


  


  


 


 


Blood Urea Nitrogen 18   


 


Creatinine 1.10   


 


Random Glucose 262   


 


Total Protein 7.5   


 


Albumin 3.5   


 


Calcium Level 9.1   


 


Alkaline Phosphatase 101   


 


Aspartate Amino Transf


(AST/SGOT) 15 


  


  


 


 


Alanine Aminotransferase


(ALT/SGPT) 12 


  


  


 


 


Total Bilirubin 0.4   


 


Sodium Level 138   


 


Potassium Level 4.1   


 


Chloride Level 102   


 


Carbon Dioxide Level 22.2   


 


Anion Gap 14   


 


Estimat Glomerular Filtration


Rate 47 


  


  


 


 


Urine Color  YELLOW  


 


Urine Turbidity  CLOUDY  


 


Urine pH  5.0  


 


Urine Specific Gravity


  


  GREATER/EQUAL


1.030 


 


 


Urine Protein  NEG  


 


Urine Glucose (UA)  NEG  


 


Urine Ketones  NEG  


 


Urine Occult Blood  MOD  


 


Urine Nitrite  NEG  


 


Urine Bilirubin  NEG  


 


Urine Urobilinogen  0.2  


 


Urine Leukocyte Esterase  NEG  


 


Urine WBC  0-2  


 


Urine Squamous Epithelial


Cells 


  0-5 


  


 


 


Urine Bacteria  MANY  


 


Microscopic Urinalysis Comment


  


  CULTURE


INDICATED 


 














 Date/Time


Source Procedure


Growth Status


 


 


 3/2/18 21:20


Urine Clean Catch Urine Culture


Pending Received








Result Diagram:  


3/2/18 2028                                                                    

            3/2/18 2028





Imaging





Last Impressions








Chest X-Ray 3/2/18 2018 Signed





Impressions: 





 Service Date/Time:  2018 20:27 - CONCLUSION: No acute 

disease.  





      Tomas Bang MD 


 


Hip and Pelvis X-Ray 3/2/18 0000 Signed





Impressions: 





 Service Date/Time:  2018 20:27 - CONCLUSION: Acute displaced 





 comminuted intertrochanteric fracture of the left proximal femur.     Tomas Bang MD 


 


Head CT 3/2/18 0000 Signed





Impressions: 





 Service Date/Time:  2018 21:27 - CONCLUSION: Diffuse cerebral 





 atrophy. Moderate periventricular and subcortical white matter small vessel 





 symptoms bilaterally. No acute infarct, acute hemorrhage, midline shift or 

extra 





 axial fluid collections.     Tomas Bang MD 


 


Cervical Spine CT 3/2/18 0000 Signed





Impressions: 





 Service Date/Time:  2018 21:27 - CONCLUSION:  No acute 

fracture 





 or prevertebral soft tissue swelling.      Moderate spinal stenosis at C4-5 

and 





 mild spinal stenosis at C5-6 and C6-7. Moderate bilateral foraminal narrowing 

at 





 C3-4, C4-5, C5-6 and C6-7. Grade I retrolisthesis of C4 in relation to C3. 

Grade 





 I anterolisthesis of C7 in relation to T1.     Tomas Bang MD 








Course


Pending surgery at this time. She is a moderate to high risk for surgical 

intervention due to advanced age and dementia.





Caprini VTE Risk Assessment


Caprini VTE Risk Assessment:  Mod/High Risk (score >= 2)


VTE Pharm Contraindication:  High risk for bleeding


Caprini Risk Assessment Model











 Point Value = 1          Point Value = 2  Point Value = 3  Point Value = 5


 


Age 41-60


Minor surgery


BMI > 25 kg/m2


Swollen legs


Varicose veins


Pregnancy or postpartum


History of unexplained or recurrent


   spontaneous 


Oral contraceptives or hormone


   replacement


Sepsis (< 1 month)


Serious lung disease, including


   pneumonia (< 1 month)


Abnormal pulmonary function


Acute myocardial infarction


Congestive heart failure (< 1 month)


History of inflammatory bowel disease


Medical patient at bed rest Age 61-74


Arthroscopic surgery


Major open surgery (> 45 min)


Laparoscopic surgery (> 45 min)


Malignancy


Confined to bed (> 72 hours)


Immobilizing plaster cast


Central venous access Age >= 75


History of VTE


Family history of VTE


Factor V Leiden


Prothrombin 48273H


Lupus anticoagulant


Anticardiolipin antibodies


Elevated serum homocysteine


Heparin-induced thrombocytopenia


Other congenital or acquired


   thrombophilia Stroke (< 1 month)


Elective arthroplasty


Hip, pelvis, or leg fracture


Acute spinal cord injury (< 1 month)








Prophylaxis Regimen











   Total Risk


Factor Score Risk Level Prophylaxis Regimen


 


0-1      Low Early ambulation


 


2 Moderate Order ONE of the following:


*Sequential Compression Device (SCD)


*Heparin 5000 units SQ BID


 


3-4 Higher Order ONE of the following medications:


*Heparin 5000 units SQ TID


*Enoxaparin/Lovenox 40 mg SQ daily (WT < 150 kg, CrCl > 30 mL/min)


*Enoxaparin/Lovenox 30 mg SQ daily (WT < 150 kg, CrCl > 10-29 mL/min)


*Enoxaparin/Lovenox 30 mg SQ BID (WT < 150 kg, CrCl > 30 mL/min)


AND/OR


*Sequential Compression Device (SCD)


 


5 or more Highest Order ONE of the following medications:


*Heparin 5000 units SQ TID (Preferred with Epidurals)


*Enoxaparin/Lovenox 40 mg SQ daily (WT < 150 kg, CrCl > 30 mL/min)


*Enoxaparin/Lovenox 30 mg SQ daily (WT < 150 kg, CrCl > 10-29 mL/min)


*Enoxaparin/Lovenox 30 mg SQ BID (WT < 150 kg, CrCl > 30 mL/min)


AND


*Sequential Compression Device (SCD)











Assessment and Plan


Problem List:  


(1) Alzheimer disease


ICD Codes:  G30.9 - Alzheimer's disease, unspecified


Status:  Chronic


Plan:  Cont current meds





(2) UTI (urinary tract infection)


ICD Codes:  N39.0 - Urinary tract infection, site not specified


Status:  Acute


Plan:  Antibiotics empirically pending C&S





(3) Fracture, proximal femur


ICD Codes:  S72.009A - Fracture of unspecified part of neck of unspecified femur

, initial encounter for closed fracture


Status:  Acute


Plan:  Surgery eval in progress





Assessment and Plan


Left hip fracture with dementia and mod to high risk for surgical intervention 

due to advanced age.


Discharge Planning


Home with family





Problem Qualifiers





(1) Fracture, proximal femur:  


Qualified Codes:  S72.002A - Fracture of unspecified part of neck of left femur

, initial encounter for closed fracture


(2) Alzheimer disease:  


Qualified Codes:  G30.1 - Alzheimer's disease with late onset; F02.80 - 

Dementia in other diseases classified elsewhere without behavioral disturbance


(3) UTI (urinary tract infection):  


Qualified Codes:  N30.00 - Acute cystitis without hematuria








Xavier Carrillo Mar 3, 2018 07:41

## 2018-03-03 NOTE — PD.CONS
cc:   Chase Bailon Jr., MD


__________________________________________________





HPI


Service


[facility]


Consult Requested By





Primary Care Physician


No Primary Care Physician


Admission Diagnosis





Left Femur fracture.


Diagnoses:  


Chief Complaint:  


Left hip fracture


History of Present Illness


85-year-old male history of Alzheimer's, noticed to be limping by family member 

was brought in emergency department and was found to have a left 

intertrochanteric femur fracture.  There is no reported witnessed fall.  

Patient is poor historian.





ROS - General


Review of Systems


ROS Limitations:  Clinical Condition (Unable to communicate at this time due to 

Alzheimers dementia), Altered Mental Status, Other





PFSH


Past Family Social History


Allergies:  


Coded Allergies:  


     epinephrine (Unverified  Adverse Reaction, Severe, Palpitations , 3/2/18)


     lidocaine (Unverified  Adverse Reaction, Severe, Palpitations , 3/2/18)


Past Medical History


Alzheimer dementia;


H/O SVT;


MVR;


HPLD;


HTN


Past Surgical History


Appy;


Cataract surgery





Past Family Social History


Allergies:  


Coded Allergies:  


     epinephrine (Unverified  Adverse Reaction, Severe, Palpitations , 3/2/18)


     lidocaine (Unverified  Adverse Reaction, Severe, Palpitations , 3/2/18)


Active Ordered Medications





Current Medications








 Medications


  (Trade)  Dose


 Ordered  Sig/Sukhi


 Route  Start Time


 Stop Time Status Last Admin


 


  (NS Flush)  2 ml  UNSCH  PRN


 IV FLUSH  3/2/18 22:15


     


 


 


  (NS Flush)  2 ml  BID


 IV FLUSH  3/3/18 09:00


    3/3/18 08:04


 


 


  (Tylenol)  650 mg  Q4H  PRN


 PO  3/2/18 22:15


     


 


 


  (Zofran Inj)  4 mg  Q6H  PRN


 IVP  3/2/18 22:15


     


 


 


  (Narcan Inj)  0.4 mg  UNSCH  PRN


 IV PUSH  3/2/18 22:15


     


 


 


  (Yeny-Colace)  1 tab  BID


 PO  3/3/18 09:00


    3/3/18 08:03


 


 


  (Milk Of


 Magnesia Liq)  30 ml  Q12H  PRN


 PO  3/2/18 22:15


     


 


 


  (Senokot)  17.2 mg  Q12H  PRN


 PO  3/2/18 22:15


     


 


 


  (Dulcolax Supp)  10 mg  DAILY  PRN


 RECTAL  3/2/18 22:15


     


 


 


  (Lactulose Liq)  30 ml  DAILY  PRN


 PO  3/2/18 22:15


     


 


 


 Sodium Chloride  1,000 ml @ 


 100 mls/hr  Q10H


 IV  3/2/18 22:15


    3/3/18 08:04


 


 


  (Morphine Inj)  2 mg  Q3H  PRN


 IV PUSH  3/2/18 22:30


    3/3/18 14:39


 


 


  (Lanoxin)  0.125 mg  DAILY


 PO  3/3/18 09:00


    3/3/18 08:03


 


 


  (SEROquel)  50 mg  HS


 PO  3/3/18 21:00


     


 


 


  (Reglan Inj)  5 mg  Q6H  PRN


 IV PUSH  3/2/18 23:00


     


 


 


 Cefazolin Sodium/


 Dextrose  50 ml @ 


 150 mls/hr  ON  CALL


 IV  3/3/18 17:45


 3/7/18 17:44   


 


 


 Lactated Ringer's  1,000 ml @ 


 30 mls/hr  Q24H PRN


 IV  3/3/18 19:45


 3/6/18 19:44   


 


 


 Sodium Chloride  500 ml @ 


 30 mls/hr  [id number] PRN


 IV  3/3/18 19:45


 3/6/18 19:44   


 


 


  (Lopressor)  25 mg  ON CALL  PRN


 PO  3/3/18 19:45


 3/6/18 19:44   


 


 


  (Betadine 5%


 Antisepsis Kit)  1 applic  ON CALL  PRN


 EACH NARE  3/3/18 19:45


 3/6/18 19:44   


 


 


  (Chlorhexidine


 2% Cloth)  3 pack  ON CALL  PRN


 TOPICAL  3/3/18 19:45


 3/6/18 19:44   


 


 


  (NovoLIN R INJ)  See


 Protocol


 Table ...  ON CALL  PRN


 SQ  3/3/18 19:45


 3/6/18 19:44   


 








Reported Meds & Active Scripts


Active


Percocet (Oxycodone-Acetaminophen) 5-325 mg Tab 1 Tab PO Q4H PRN


Xarelto (Rivaroxaban) 10 Mg Tab 10 Mg PO DAILY


Reported


Digoxin 0.125 Mg Tab 0.125 Mg PO DAILY


Quetiapine (Quetiapine Fumarate) 50 Mg Tab 50 Mg PO HS





Physical Exam


Vital Signs





Vital Signs








  Date Time  Temp Pulse Resp B/P (MAP) Pulse Ox O2 Delivery O2 Flow Rate FiO2


 


3/3/18 17:23    152/70 (97)    





    Manual Cuff/Auscultation    


 


3/3/18 16:44    158/74 (102)    





    Automatic Cuff    


 


3/3/18 16:34 98.2 97 18 179/69 (105) 94   


 


3/3/18 13:25 96.4 92 19 116/91 (99) 96   


 


3/3/18 09:14 97.8 90 18 129/78 (95) 97   


 


3/3/18 05:44 98.0 78 18 140/70 (93) 95   


 


3/3/18 03:41   16     


 


3/3/18 00:45 97.4 74 18 141/69 (93) 98   


 


3/3/18 00:11        


 


3/2/18 23:01  89 20 140/64 (89) 97 Room Air  


 


3/2/18 21:57  96 22  94 Room Air  


 


3/2/18 21:00  109 24     


 


3/2/18 21:00  108  111/70 (84) 96 Room Air  


 


3/2/18 20:17 98.5 106 24 128/103 (111) 97   








Physical Exam


Demented


Head: NC/AT


Neck: No pain with any range of motion and neck.


Pulmonary: Normal respiratory effort.


Bilateral upper extremity:  No deformity.  Grossly neurovascularly intact.


RIGHT lower extremity: Shortened and externally rotated. grossly 

Neurovascularly intact, +EHL/FHL, + PT/DP pulses.  Supple compartments..


LEFT lower extremity: Neurovascularly intact,


Laboratory





Laboratory Tests








Test


  3/2/18


20:28 3/2/18


21:20 3/3/18


06:16


 


White Blood Count 23.2   12.6 


 


Red Blood Count 4.56   3.44 


 


Hemoglobin 13.1   10.1 


 


Hematocrit 39.1   29.6 


 


Mean Corpuscular Volume 85.9   86.1 


 


Mean Corpuscular Hemoglobin 28.7   29.3 


 


Mean Corpuscular Hemoglobin


Concent 33.4 


  


  34.0 


 


 


Red Cell Distribution Width 12.3   13.2 


 


Platelet Count 320   232 


 


Mean Platelet Volume 9.3   9.6 


 


Neutrophils (%) (Auto) 93.3   82.6 


 


Lymphocytes (%) (Auto) 3.5   7.7 


 


Monocytes (%) (Auto) 2.4   9.6 


 


Eosinophils (%) (Auto) 0.2   0.0 


 


Basophils (%) (Auto) 0.6   0.1 


 


Neutrophils # (Auto) 21.7   10.4 


 


Lymphocytes # (Auto) 0.8   1.0 


 


Monocytes # (Auto) 0.6   1.2 


 


Eosinophils # (Auto) 0.0   0.0 


 


Basophils # (Auto) 0.1   0.0 


 


CBC Comment DIFF FINAL   DIFF FINAL 


 


Differential Comment     


 


Prothrombin Time 10.5   


 


Prothromb Time International


Ratio 1.0 


  


  


 


 


Activated Partial


Thromboplast Time 21.8 


  


  


 


 


Blood Urea Nitrogen 18   20 


 


Creatinine 1.10   0.79 


 


Random Glucose 262   159 


 


Total Protein 7.5   


 


Albumin 3.5   


 


Calcium Level 9.1   8.4 


 


Alkaline Phosphatase 101   


 


Aspartate Amino Transf


(AST/SGOT) 15 


  


  


 


 


Alanine Aminotransferase


(ALT/SGPT) 12 


  


  


 


 


Total Bilirubin 0.4   


 


Sodium Level 138   142 


 


Potassium Level 4.1   4.5 


 


Chloride Level 102   109 


 


Carbon Dioxide Level 22.2   27.4 


 


Anion Gap 14   6 


 


Estimat Glomerular Filtration


Rate 47 


  


  69 


 


 


Urine Color  YELLOW  


 


Urine Turbidity  CLOUDY  


 


Urine pH  5.0  


 


Urine Specific Gravity


  


  GREATER/EQUAL


1.030 


 


 


Urine Protein  NEG  


 


Urine Glucose (UA)  NEG  


 


Urine Ketones  NEG  


 


Urine Occult Blood  MOD  


 


Urine Nitrite  NEG  


 


Urine Bilirubin  NEG  


 


Urine Urobilinogen  0.2  


 


Urine Leukocyte Esterase  NEG  


 


Urine WBC  0-2  


 


Urine Squamous Epithelial


Cells 


  0-5 


  


 


 


Urine Bacteria  MANY  


 


Microscopic Urinalysis Comment


  


  CULTURE


INDICATED 


 


 


Total Creatine Kinase   64 














 Date/Time


Source Procedure


Growth Status


 


 


 3/2/18 21:20


Urine Clean Catch Urine Culture - Preliminary


Gram Negative Tian Resulted








Result Diagram:  


3/3/18 0616                                                                    

            3/3/18 0616





Imaging





Last 72 hours Impressions








Chest X-Ray 3/2/18 2018 Signed





Impressions: 





 Service Date/Time:  Friday, March 2, 2018 20:27 - CONCLUSION: No acute 

disease.  





      Tomas Bang MD 


 


Hip and Pelvis X-Ray 3/2/18 0000 Signed





Impressions: 





 Service Date/Time:  Friday, March 2, 2018 20:27 - CONCLUSION: Acute displaced 





 comminuted intertrochanteric fracture of the left proximal femur.     Tomas Bang MD 


 


Head CT 3/2/18 0000 Signed





Impressions: 





 Service Date/Time:  Friday, March 2, 2018 21:27 - CONCLUSION: Diffuse cerebral 





 atrophy. Moderate periventricular and subcortical white matter small vessel 





 symptoms bilaterally. No acute infarct, acute hemorrhage, midline shift or 

extra 





 axial fluid collections.     Tomas Bang MD 


 


Cervical Spine CT 3/2/18 0000 Signed





Impressions: 





 Service Date/Time:  Friday, March 2, 2018 21:27 - CONCLUSION:  No acute 

fracture 





 or prevertebral soft tissue swelling.      Moderate spinal stenosis at C4-5 

and 





 mild spinal stenosis at C5-6 and C6-7. Moderate bilateral foraminal narrowing 

at 





 C3-4, C4-5, C5-6 and C6-7. Grade I retrolisthesis of C4 in relation to C3. 

Grade 





 I anterolisthesis of C7 in relation to T1.     Tomas Bang MD 











Assessment & Plan


Assessment and Plan


85-year-old male history of Alzheimer's, noticed to be limping by family member 

was brought in emergency department and was found to have a left 

intertrochanteric femur fracture.  There is no history of recent trauma.  The 

fracture is unstable.  I recommend jose francisco-hip arthroplasty.  I discussed my 

treatment plans with the patient family at bedside, as well as risks, benefits 

and alternatives of surgical Intervention versus nonoperative treatment.  In 

this case, the risks of operative intervention involves bleeding, infection, 

nonunion, malunion, risks of damage to neurovascular structures, the risk of 

needing further surgery, posttraumatic arthritis and the risks involved with 

complication from anesthesia.  We will proceed with the above procedure.  The 

patient accepts these risks; understands and agrees with my recommendations. I 

also discussed my proposed postoperative care and follow-up plan. All questions 

were answered.


Plan for OR [].  Nothing by mouth [].


Patient consented.





Thanks for the consult, thanks for allowing me to participate in this patient's 

medical care.











Chase Bailon Jr., MD Mar 3, 2018 19:59

## 2018-03-03 NOTE — EKG
Date Performed: 03/02/2018       Time Performed: 20:30:35

 

PTAGE:      85 years

 

EKG:      SINUS TACHYCARDIA ST DEVIATION AND MODERATE T-WAVE ABNORMALITY, CONSIDER LATERAL ISCHEMIA A
BNORMAL ECG

 

PREVIOUS TRACING       : 07/01/2017 12.02

 

DOCTOR:   Ab Ruth  Interpretating Date/Time  03/03/2018 09:06:39

## 2018-03-04 VITALS
TEMPERATURE: 97.8 F | HEART RATE: 89 BPM | RESPIRATION RATE: 18 BRPM | SYSTOLIC BLOOD PRESSURE: 138 MMHG | DIASTOLIC BLOOD PRESSURE: 67 MMHG | OXYGEN SATURATION: 96 %

## 2018-03-04 VITALS — OXYGEN SATURATION: 93 %

## 2018-03-04 VITALS
HEART RATE: 74 BPM | DIASTOLIC BLOOD PRESSURE: 70 MMHG | OXYGEN SATURATION: 92 % | RESPIRATION RATE: 18 BRPM | SYSTOLIC BLOOD PRESSURE: 141 MMHG | TEMPERATURE: 97 F

## 2018-03-04 VITALS
RESPIRATION RATE: 18 BRPM | TEMPERATURE: 98.3 F | HEART RATE: 80 BPM | DIASTOLIC BLOOD PRESSURE: 59 MMHG | SYSTOLIC BLOOD PRESSURE: 120 MMHG | OXYGEN SATURATION: 93 %

## 2018-03-04 VITALS
SYSTOLIC BLOOD PRESSURE: 148 MMHG | DIASTOLIC BLOOD PRESSURE: 82 MMHG | RESPIRATION RATE: 20 BRPM | HEART RATE: 86 BPM | OXYGEN SATURATION: 94 % | TEMPERATURE: 97.7 F

## 2018-03-04 VITALS
RESPIRATION RATE: 18 BRPM | HEART RATE: 96 BPM | DIASTOLIC BLOOD PRESSURE: 58 MMHG | SYSTOLIC BLOOD PRESSURE: 115 MMHG | TEMPERATURE: 98 F | OXYGEN SATURATION: 94 %

## 2018-03-04 PROCEDURE — 0QS706Z REPOSITION LEFT UPPER FEMUR WITH INTRAMEDULLARY INTERNAL FIXATION DEVICE, OPEN APPROACH: ICD-10-PCS | Performed by: ORTHOPAEDIC SURGERY

## 2018-03-04 RX ADMIN — PHENYTOIN SODIUM SCH MLS/HR: 50 INJECTION INTRAMUSCULAR; INTRAVENOUS at 23:44

## 2018-03-04 RX ADMIN — Medication SCH ML: at 20:26

## 2018-03-04 RX ADMIN — DIGOXIN SCH MG: 125 TABLET ORAL at 08:34

## 2018-03-04 RX ADMIN — PHENYTOIN SODIUM SCH MLS/HR: 50 INJECTION INTRAMUSCULAR; INTRAVENOUS at 14:15

## 2018-03-04 RX ADMIN — PHENYTOIN SODIUM SCH MLS/HR: 50 INJECTION INTRAMUSCULAR; INTRAVENOUS at 03:53

## 2018-03-04 RX ADMIN — STANDARDIZED SENNA CONCENTRATE AND DOCUSATE SODIUM SCH TAB: 8.6; 5 TABLET, FILM COATED ORAL at 20:26

## 2018-03-04 RX ADMIN — Medication SCH ML: at 08:34

## 2018-03-04 RX ADMIN — STANDARDIZED SENNA CONCENTRATE AND DOCUSATE SODIUM SCH TAB: 8.6; 5 TABLET, FILM COATED ORAL at 08:34

## 2018-03-04 RX ADMIN — MAGNESIUM HYDROXIDE PRN ML: 400 SUSPENSION ORAL at 20:28

## 2018-03-04 NOTE — PD.OP
cc:   Chase Bailon Jr., MD


__________________________________________________





Operative Report


Date of Surgery:  Mar 4, 2018


Preoperative Diagnosis:  


left intertrochanteric femur fracture


Postoperative Diagnosis:  


Same


Procedure:


Left hip intramedullary eric fixation


Anesthesia:


Gen.


Surgeon:


Chase Bailon


Assistant(s):


WILLIAMS Santiago


 


The surgical procedure was assisted by my Advanced Registered Nurse 

Practitioner.  My ARNP presence was necessary throughout this case for the 

manipulation and positioning of the surgical extremity. My ARNP was assisting 

me throughout the duration of this procedure.  The skill set of an Advance 

Registered Nurse Practitioner was medically necessary to complete this 

procedure.  During the surgical case, the surgical tech was working at the back 

table and the Advance Registered Nurse Practitioner was directly assisting me.


Resident Surgeon:


None


Operation and Findings:


Estimated blood loss: 50 cc





The patient received intravenous ancef.  After the appropriate anesthesia was 

administered, and the patient was transferred to the fracture table.  The 

fracture was anatomically reduced under fluoroscopic imaging.





The hip was prepped and draped in usual sterile fashion.  We made incision just 

proximal to the tip of the greater trochanter.  We dissected down through the 

deep fascia.  We used a threaded guidewire at the tip of the greater trochanter 

which was placed down to the metaphyseal region on both the AP and lateral 

views.  We reamed proximally.





Using fluoroscopic analysis we templated the appropriate size for the short 

nail.  This nail was then placed into position under fluoroscopic guidance.  We 

made incision laterally based on the position of the associated jig.  We then 

placed a threaded guidewire into the center, center of the femoral head.  The 

appropriate length for the helical blade was measured.  We drilled laterally 

and then step reamed the femoral neck and femoral head region.





The helical blade was placed into position.  We then tightened the proximal set 

screw, which was followed by releasing one turn off of the screw to allow for 

compression.  Traction was released from the leg and then manual compression 

was performed.  The nail was secured distally with a single screw off of the 

jig using fluoroscopic guidance.





We took final fluoroscopic imaging which revealed that the fracture was in very 

good position. The hardware was in good position as well. The wounds were 

thoroughly irrigated and then closed with a 0 Vicryl followed by 2-0 Vicryl and 

staples.





The postoperative plan is to start 50% weightbearing.  Additionally, we will 

initiate postoperative antibiotics for 24 hours along with DVT prophylaxis 

consisting of early mobilization, SCDs, compression stockings, and lovenox








IMPLANTS USED


Synthes short trochanteric nail, size:





POSTP-OP PLAN OF ACTIVITY


Antibiotics: clinda


Antiocoagulation: Lovenox while hospitalized (xeralto at discharge)


Weight bearing status: 50%


Dressing: Change daily, by RN starting postop day 2


Future procedure planned: none


Dispo: expected discharge 2-3 days SNF











Chase Bailon Jr., MD Mar 4, 2018 15:47

## 2018-03-04 NOTE — HHI.PR
Subjective


Remarks


Patient previously ambulatory noted by family to be limping and was found in ED 

to have and intertrochanteric hip fracture and is for surgical repair per Ortho.





Objective





Vital Signs








  Date Time  Temp Pulse Resp B/P (MAP) Pulse Ox O2 Delivery O2 Flow Rate FiO2


 


3/4/18 08:00 97.7 86 20 148/82 (104) 94   


 


3/4/18 04:25 97.8 89 18 138/67 (90) 96   


 


3/4/18 00:25 98.0 96 18 115/58 (77) 94   


 


3/3/18 20:15 96.8 96 18 159/68 (98) 95   





    Manual Cuff/Auscultation    


 


3/3/18 17:23    152/70 (97)    





    Manual Cuff/Auscultation    


 


3/3/18 16:44    158/74 (102)    





    Automatic Cuff    


 


3/3/18 16:34 98.2 97 18 179/69 (105) 94   














I/O      


 


 3/3/18 3/3/18 3/3/18 3/4/18 3/4/18 3/4/18





 06:59 14:59 22:59 06:59 14:59 22:59


 


Intake Total  950 ml 800 ml 692 ml 300 ml 


 


Output Total   1050 ml 200 ml 500 ml 


 


Balance  950 ml -250 ml 492 ml -200 ml 


 


      


 


Intake Oral   0 ml 0 ml  


 


IV Total  950 ml 800 ml 692 ml 300 ml 


 


Output Urine Total   1050 ml 200 ml 350 ml 


 


Estimated Blood Loss     150 ml 


 


# Bowel Movements   0 0  








Result Diagram:  


3/3/18 0616                                                                    

            3/3/18 0616





Imaging





Last Impressions








Hip X-Ray 3/4/18 0000 Signed





Impressions: 





 Service Date/Time:  Sunday, March 4, 2018 12:53 - CONCLUSION: Abdominal 





 alignment     Bob Lucia MD  FACR


 


Chest X-Ray 3/2/18 2018 Signed





Impressions: 





 Service Date/Time:  Friday, March 2, 2018 20:27 - CONCLUSION: No acute 

disease.  





      Tomas Bang MD 


 


Hip and Pelvis X-Ray 3/2/18 0000 Signed





Impressions: 





 Service Date/Time:  Friday, March 2, 2018 20:27 - CONCLUSION: Acute displaced 





 comminuted intertrochanteric fracture of the left proximal femur.     Tomas Bang MD 


 


Head CT 3/2/18 0000 Signed





Impressions: 





 Service Date/Time:  Friday, March 2, 2018 21:27 - CONCLUSION: Diffuse cerebral 





 atrophy. Moderate periventricular and subcortical white matter small vessel 





 symptoms bilaterally. No acute infarct, acute hemorrhage, midline shift or 

extra 





 axial fluid collections.     Tomas Bang MD 


 


Cervical Spine CT 3/2/18 0000 Signed





Impressions: 





 Service Date/Time:  Friday, March 2, 2018 21:27 - CONCLUSION:  No acute 

fracture 





 or prevertebral soft tissue swelling.      Moderate spinal stenosis at C4-5 

and 





 mild spinal stenosis at C5-6 and C6-7. Moderate bilateral foraminal narrowing 

at 





 C3-4, C4-5, C5-6 and C6-7. Grade I retrolisthesis of C4 in relation to C3. 

Grade 





 I anterolisthesis of C7 in relation to T1.     Tomas Bang MD 








Objective Remarks


General - Demented and awake


HEENT - AT/NC


Resp - CTA


CV - Without murmur rub or gallop


Abd - soft and nontender with active BS


Extremities - left hip tender with external rotation of LLE and NV intact 

distally


Medications and IVs





Current Medications








 Medications


  (Trade)  Dose


 Ordered  Sig/Sukhi


 Route  Start Time


 Stop Time Status Last Admin


 


  (NS Flush)  2 ml  UNSCH  PRN


 IV FLUSH  3/2/18 22:15


     


 


 


  (NS Flush)  2 ml  BID


 IV FLUSH  3/3/18 09:00


    3/3/18 20:53


 


 


  (Tylenol)  650 mg  Q4H  PRN


 PO  3/2/18 22:15


     


 


 


  (Zofran Inj)  4 mg  Q6H  PRN


 IVP  3/2/18 22:15


     


 


 


  (Narcan Inj)  0.4 mg  UNSCH  PRN


 IV PUSH  3/2/18 22:15


     


 


 


  (Yeny-Colace)  1 tab  BID


 PO  3/3/18 09:00


    3/4/18 08:34


 


 


  (Milk Of


 Magnesia Liq)  30 ml  Q12H  PRN


 PO  3/2/18 22:15


     


 


 


  (Senokot)  17.2 mg  Q12H  PRN


 PO  3/2/18 22:15


     


 


 


  (Dulcolax Supp)  10 mg  DAILY  PRN


 RECTAL  3/2/18 22:15


     


 


 


  (Lactulose Liq)  30 ml  DAILY  PRN


 PO  3/2/18 22:15


     


 


 


 Sodium Chloride  1,000 ml @ 


 100 mls/hr  Q10H


 IV  3/2/18 22:15


    3/4/18 03:53


 


 


  (Morphine Inj)  2 mg  Q3H  PRN


 IV PUSH  3/2/18 22:30


    3/3/18 14:39


 


 


  (Lanoxin)  0.125 mg  DAILY


 PO  3/3/18 09:00


    3/4/18 08:34


 


 


  (SEROquel)  50 mg  HS


 PO  3/3/18 21:00


    3/3/18 20:53


 


 


  (Reglan Inj)  5 mg  Q6H  PRN


 IV PUSH  3/2/18 23:00


     


 


 


 Cefazolin Sodium/


 Dextrose  50 ml @ 


 150 mls/hr  ON  CALL


 IV  3/3/18 17:45


 3/7/18 17:44   


 


 


 Lactated Ringer's  1,000 ml @ 


 30 mls/hr  Q24H PRN


 IV  3/3/18 19:45


 3/6/18 19:44  3/4/18 06:58


 


 


 Sodium Chloride  500 ml @ 


 30 mls/hr  [id number] PRN


 IV  3/3/18 19:45


 3/6/18 19:44   


 


 


  (Lopressor)  25 mg  ON CALL  PRN


 PO  3/3/18 19:45


 3/6/18 19:44   


 


 


  (Betadine 5%


 Antisepsis Kit)  1 applic  ON CALL  PRN


 EACH NARE  3/3/18 19:45


 3/6/18 19:44   


 


 


  (Chlorhexidine


 2% Cloth)  3 pack  ON CALL  PRN


 TOPICAL  3/3/18 19:45


 3/6/18 19:44   


 


 


  (NovoLIN R INJ)  See


 Protocol


 Table ...  ON CALL  PRN


 SQ  3/3/18 19:45


 3/6/18 19:44   


 


 


  (NS Flush)  2 ml  UNSCH  PRN


 IV FLUSH  3/4/18 12:15


   UNV  


 


 


  (NS Flush)  2 ml  BID


 IV FLUSH  3/4/18 21:00


     


 


 


 Cefazolin Sodium


 1000 mg/Sodium


 Chloride  100 ml @ 


 200 mls/hr  Q6H


 IV  3/4/18 12:15


 3/5/18 00:44 UNV  


 


 


  (Post-op Orders


  (for Pharmacy))    STAT  ONCE


 XX  3/4/18 14:00


 3/4/18 14:01   


 


 


  (Lovenox Inj)  30 mg  Q12H


 SQ  3/4/18 12:15


   UNV  


 


 


  (Morphine Inj)  5 mg  Q3H  PRN


 IV PUSH  3/4/18 14:00


     


 


 


  (Norco  5-325 Mg)  1 tab  Q4H  PRN


 PO  3/4/18 14:00


     


 


 


  (Norco  5-325 Mg)  2 tab  Q4H  PRN


 PO  3/4/18 14:00


     


 


 


  (Phenergan)  25 mg  Q4H  PRN


 PO  3/4/18 14:00


     


 


 


  (Phenergan Supp)  25 mg  Q4H  PRN


 RECTAL  3/4/18 14:00


     


 


 


  (Ambien)  5 mg  HS  PRN


 PO  3/4/18 21:00


     


 


 


  (Yeny-Colace)  1 tab  BID


 PO  3/4/18 21:00


     


 


 


  (Milk Of


 Magnesia Liq)  30 ml  Q12H  PRN


 PO  3/4/18 14:00


     


 


 


  (Senokot)  17.2 mg  Q12H  PRN


 PO  3/4/18 14:00


     


 


 


  (Dulcolax Supp)  10 mg  DAILY  PRN


 RECTAL  3/4/18 14:00


     


 


 


  (Lactulose Liq)  30 ml  DAILY  PRN


 PO  3/4/18 14:00


     


 











Assessment and Plan


Problem List:  


(1) Alzheimer disease


ICD Codes:  G30.9 - Alzheimer's disease, unspecified


Status:  Chronic


Plan:  Cont current meds





(2) UTI (urinary tract infection)


ICD Codes:  N39.0 - Urinary tract infection, site not specified


Status:  Acute


Plan:  Cefazolin ordered by Ortho for prophylaxis for hip surgery will cover 

UTI.





(3) Fracture, proximal femur


ICD Codes:  S72.009A - Fracture of unspecified part of neck of unspecified femur

, initial encounter for closed fracture


Status:  Acute


Plan:  Surgery as planned per Ortho





Assessment and Plan


Left hip fracture with dementia and mod to high risk for surgical intervention 

due to advanced age.


Discharge Planning


Home when cleared by Ortho





Problem Qualifiers





(1) Alzheimer disease:  


Qualified Codes:  G30.1 - Alzheimer's disease with late onset; F02.80 - 

Dementia in other diseases classified elsewhere without behavioral disturbance


(2) UTI (urinary tract infection):  


Qualified Codes:  N30.00 - Acute cystitis without hematuria


(3) Fracture, proximal femur:  


Qualified Codes:  S72.002A - Fracture of unspecified part of neck of left femur

, initial encounter for closed fracture








Xavier Carrillo Mar 4, 2018 14:11

## 2018-03-04 NOTE — RADRPT
EXAM DATE/TIME:  03/04/2018 12:53 

 

[facility] COMPARISON:     

No previous studies available for comparison.

 

                     

INDICATIONS :     

Left hip troch nail. 

                     

 

MEDICAL HISTORY :            

Hypercholesterolemia. Osteoporosis. Hypertension. Dementia, Alzheimer's disease, irregular heartbeat,
 anxiety, osteopenia, right femur fracture.   

 

SURGICAL HISTORY :        

Right hip troch nail. 

 

ENCOUNTER:     

Initial                                        

 

ACUITY:     

1 day      

 

PAIN SCORE:     

Non-responsive.

 

LOCATION:     

Left  hip. 

 

FINDINGS:     

Intertrochanteric nail is present left hip directed towards the superior margin on the lateral..  Nandini
tomic alignment

CONCLUSION:     Abdominal alignment

 

 

 

 Bob Lucia MD FACR on March 04, 2018 at 13:16           

Board Certified Radiologist.

 This report was verified electronically.

## 2018-03-05 VITALS
OXYGEN SATURATION: 92 % | TEMPERATURE: 96.7 F | SYSTOLIC BLOOD PRESSURE: 132 MMHG | RESPIRATION RATE: 18 BRPM | HEART RATE: 81 BPM | DIASTOLIC BLOOD PRESSURE: 80 MMHG

## 2018-03-05 VITALS
SYSTOLIC BLOOD PRESSURE: 130 MMHG | TEMPERATURE: 98.2 F | OXYGEN SATURATION: 91 % | HEART RATE: 82 BPM | RESPIRATION RATE: 15 BRPM | DIASTOLIC BLOOD PRESSURE: 59 MMHG

## 2018-03-05 VITALS
RESPIRATION RATE: 18 BRPM | OXYGEN SATURATION: 92 % | HEART RATE: 81 BPM | SYSTOLIC BLOOD PRESSURE: 124 MMHG | TEMPERATURE: 96.5 F | DIASTOLIC BLOOD PRESSURE: 60 MMHG

## 2018-03-05 VITALS
DIASTOLIC BLOOD PRESSURE: 60 MMHG | RESPIRATION RATE: 19 BRPM | HEART RATE: 66 BPM | SYSTOLIC BLOOD PRESSURE: 120 MMHG | OXYGEN SATURATION: 95 % | TEMPERATURE: 96.8 F

## 2018-03-05 VITALS
RESPIRATION RATE: 18 BRPM | SYSTOLIC BLOOD PRESSURE: 121 MMHG | OXYGEN SATURATION: 100 % | DIASTOLIC BLOOD PRESSURE: 66 MMHG | HEART RATE: 78 BPM | TEMPERATURE: 96.7 F

## 2018-03-05 VITALS
RESPIRATION RATE: 18 BRPM | OXYGEN SATURATION: 94 % | TEMPERATURE: 96.4 F | HEART RATE: 80 BPM | DIASTOLIC BLOOD PRESSURE: 67 MMHG | SYSTOLIC BLOOD PRESSURE: 106 MMHG

## 2018-03-05 RX ADMIN — Medication SCH ML: at 09:00

## 2018-03-05 RX ADMIN — STANDARDIZED SENNA CONCENTRATE AND DOCUSATE SODIUM SCH TAB: 8.6; 5 TABLET, FILM COATED ORAL at 21:34

## 2018-03-05 RX ADMIN — STANDARDIZED SENNA CONCENTRATE AND DOCUSATE SODIUM SCH TAB: 8.6; 5 TABLET, FILM COATED ORAL at 09:03

## 2018-03-05 RX ADMIN — SENNOSIDES PRN MG: 8.6 TABLET, FILM COATED ORAL at 21:34

## 2018-03-05 RX ADMIN — DIGOXIN SCH MG: 125 TABLET ORAL at 09:03

## 2018-03-05 RX ADMIN — Medication SCH ML: at 21:39

## 2018-03-05 RX ADMIN — PHENYTOIN SODIUM SCH MLS/HR: 50 INJECTION INTRAMUSCULAR; INTRAVENOUS at 10:15

## 2018-03-05 RX ADMIN — MAGNESIUM HYDROXIDE PRN ML: 400 SUSPENSION ORAL at 21:34

## 2018-03-05 RX ADMIN — PHENYTOIN SODIUM SCH MLS/HR: 50 INJECTION INTRAMUSCULAR; INTRAVENOUS at 20:15

## 2018-03-05 RX ADMIN — ENOXAPARIN SODIUM SCH MG: 30 INJECTION SUBCUTANEOUS at 02:00

## 2018-03-05 RX ADMIN — SODIUM CHLORIDE SCH MLS/HR: 900 INJECTION INTRAVENOUS at 12:16

## 2018-03-05 RX ADMIN — ENOXAPARIN SODIUM SCH MG: 30 INJECTION SUBCUTANEOUS at 14:10

## 2018-03-05 RX ADMIN — HYDROCODONE BITARTRATE AND ACETAMINOPHEN PRN TAB: 5; 325 TABLET ORAL at 09:03

## 2018-03-05 NOTE — HHI.PR
Subjective


Remarks


Up in chair, caregiver at bedside.





Objective





Vital Signs








  Date Time  Temp Pulse Resp B/P (MAP) Pulse Ox O2 Delivery O2 Flow Rate FiO2


 


3/5/18 08:00 96.8 66 19 120/60 (80) 95   


 


3/5/18 05:22 96.4 80 18 106/67 (80) 94   


 


3/5/18 00:48 96.7 78 18 121/66 (84) 100   


 


3/4/18 20:43     93   


 


3/4/18 20:40 98.3 80 18 120/59 (79) 93   


 


3/4/18 14:30  62 16 153/74 (100) 96 Room Air  


 


3/4/18 14:15  66 16 150/63 (92) 100   


 


3/4/18 14:05  80  186/85 (118)    


 


3/4/18 14:00  82 16 189/86 (120) 100 Nasal Cannula 2 


 


3/4/18 13:45 97.0 50 16 132/85 (101) 100 Nasal Cannula 2 


 


3/4/18 12:00 97.0 74 18 141/70 (93) 92   














I/O      


 


 3/4/18 3/4/18 3/4/18 3/5/18 3/5/18 3/5/18





 07:00 15:00 23:00 07:00 15:00 23:00


 


Intake Total 692 ml 350 ml 240 ml 743 ml  


 


Output Total 200 ml 625 ml 250 ml 200 ml  


 


Balance 492 ml -275 ml -10 ml 543 ml  


 


      


 


Intake Oral 0 ml  240 ml 120 ml  


 


IV Total 692 ml 350 ml  623 ml  


 


Output Urine Total 200 ml 475 ml 250 ml 200 ml  


 


Estimated Blood Loss  150 ml    


 


# Bowel Movements 0  0   








Result Diagram:  


3/3/18 0616                                                                    

            3/3/18 0616





Procedures


Left hip intramedullary eric fixation 3/4/18


Objective Remarks


GENERAL: This is a thin, elderly,female up in chair mumbling


SKIN: No rashes, ecchymoses or lesions. Cool and dry.


HEAD: Atraumatic. Normocephalic. No temporal or scalp tenderness.


EYES: Pupils equal round and reactive. Extraocular motions intact. No scleral 

icterus. No injection or drainage. 


ENT: Nose without bleeding, purulent drainage


NECK: Trachea midline. No JVD or lymphadenopathy. 


CARDIOVASCULAR: Regular rate and rhythm without murmurs, gallops, or rubs. 


RESPIRATORY: Clear to auscultation. Breath sounds equal bilaterally. No wheezes

, rales, or rhonchi.  


GASTROINTESTINAL: Abdomen soft, non-tender, nondistended. 


MUSCULOSKELETAL: surgical dressing to Left hip


NEUROLOGICAL: Awake and responds to touch, nonverbal, mumbles. per caregiver 

this is her baseline


Medications and IVs





Current Medications








 Medications


  (Trade)  Dose


 Ordered  Sig/Sukhi


 Route  Start Time


 Stop Time Status Last Admin


 


  (Tylenol)  650 mg  Q4H  PRN


 PO  3/2/18 22:15


     


 


 


  (Zofran Inj)  4 mg  Q6H  PRN


 IVP  3/2/18 22:15


     


 


 


  (Narcan Inj)  0.4 mg  UNSCH  PRN


 IV PUSH  3/2/18 22:15


     


 


 


 Sodium Chloride  1,000 ml @ 


 100 mls/hr  Q10H


 IV  3/2/18 22:15


    3/4/18 23:44


 


 


  (Morphine Inj)  2 mg  Q3H  PRN


 IV PUSH  3/2/18 22:30


    3/3/18 14:39


 


 


  (Lanoxin)  0.125 mg  DAILY


 PO  3/3/18 09:00


    3/5/18 09:03


 


 


  (SEROquel)  50 mg  HS


 PO  3/3/18 21:00


    3/4/18 20:26


 


 


  (Reglan Inj)  5 mg  Q6H  PRN


 IV PUSH  3/2/18 23:00


     


 


 


 Cefazolin Sodium/


 Dextrose  50 ml @ 


 150 mls/hr  ON  CALL


 IV  3/3/18 17:45


 3/7/18 17:44   


 


 


 Lactated Ringer's  1,000 ml @ 


 30 mls/hr  Q24H PRN


 IV  3/3/18 19:45


 3/6/18 19:44  3/4/18 06:58


 


 


 Sodium Chloride  500 ml @ 


 30 mls/hr  [id number] PRN


 IV  3/3/18 19:45


 3/6/18 19:44   


 


 


  (Lopressor)  25 mg  ON CALL  PRN


 PO  3/3/18 19:45


 3/6/18 19:44   


 


 


  (Betadine 5%


 Antisepsis Kit)  1 applic  ON CALL  PRN


 EACH NARE  3/3/18 19:45


 3/6/18 19:44   


 


 


  (Chlorhexidine


 2% Cloth)  3 pack  ON CALL  PRN


 TOPICAL  3/3/18 19:45


 3/6/18 19:44   


 


 


  (NovoLIN R INJ)  See


 Protocol


 Table ...  ON CALL  PRN


 SQ  3/3/18 19:45


 3/6/18 19:44   


 


 


  (NS Flush)  2 ml  UNSCH  PRN


 IV FLUSH  3/4/18 14:00


     


 


 


  (NS Flush)  2 ml  BID


 IV FLUSH  3/4/18 21:00


     


 


 


  (Lovenox Inj)  30 mg  Q12H


 SQ  3/5/18 02:00


    3/5/18 02:00


 


 


  (Morphine Inj)  5 mg  Q3H  PRN


 IV PUSH  3/4/18 14:00


     


 


 


  (Norco  5-325 Mg)  1 tab  Q4H  PRN


 PO  3/4/18 14:00


    3/5/18 09:03


 


 


  (Norco  5-325 Mg)  2 tab  Q4H  PRN


 PO  3/4/18 14:00


     


 


 


  (Phenergan)  25 mg  Q4H  PRN


 PO  3/4/18 14:00


     


 


 


  (Phenergan Supp)  25 mg  Q4H  PRN


 RECTAL  3/4/18 14:00


     


 


 


  (Ambien)  5 mg  HS  PRN


 PO  3/4/18 21:00


     


 


 


  (Yeny-Colace)  1 tab  BID


 PO  3/4/18 21:00


    3/5/18 09:03


 


 


  (Milk Of


 Magnesia Liq)  30 ml  Q12H  PRN


 PO  3/4/18 14:00


    3/4/18 20:28


 


 


  (Senokot)  17.2 mg  Q12H  PRN


 PO  3/4/18 14:00


     


 


 


  (Dulcolax Supp)  10 mg  DAILY  PRN


 RECTAL  3/4/18 14:00


     


 


 


  (Lactulose Liq)  30 ml  DAILY  PRN


 PO  3/4/18 14:00


     


 


 


 Miscellaneous


 Information  ALL


 NURSING


 DEPARTME...  UNSCH  PRN


 .XX  3/4/18 14:00


 3/5/18 13:59   


 











Assessment and Plan


Problem List:  


(1) Fracture, proximal femur


ICD Codes:  S72.009A - Fracture of unspecified part of neck of unspecified femur

, initial encounter for closed fracture


Status:  Acute


Plan:  Left hip intramedullary eric fixation 3/4/18


PT/ eval and treat, pain control. 


Lovenox DVT prophylax


Case management  for SNF placement





(2) Alzheimer disease


ICD Codes:  G30.9 - Alzheimer's disease, unspecified


Status:  Chronic


(3) HTN (hypertension)


ICD Codes:  I10 - Essential (primary) hypertension


Plan:  Cont home medications





(4) UTI (urinary tract infection)


ICD Codes:  N39.0 - Urinary tract infection, site not specified


Status:  Acute


Plan:  Culture grew Klebsiella Pneumoniae. 


Rocephin IV





Discharge Planning


SNF at [facility]





Problem Qualifiers





(1) Fracture, proximal femur:  


Qualified Codes:  S72.002A - Fracture of unspecified part of neck of left femur

, initial encounter for closed fracture


(2) Alzheimer disease:  


Qualified Codes:  G30.1 - Alzheimer's disease with late onset; F02.80 - 

Dementia in other diseases classified elsewhere without behavioral disturbance


(3) UTI (urinary tract infection):  


Qualified Codes:  N30.00 - Acute cystitis without hematuria








Pamela Burdick Mar 5, 2018 10:40

## 2018-03-05 NOTE — PD.ORT.PN
Subjective


Subjective Remarks


no issues





Objective


Vitals





Vital Signs








  Date Time  Temp Pulse Resp B/P (MAP) Pulse Ox O2 Delivery O2 Flow Rate FiO2


 


3/5/18 16:00 96.7 81 18 132/80 (97) 92   


 


3/5/18 12:00 96.5 81 18 124/60 (81) 92   


 


3/5/18 08:00 96.8 66 19 120/60 (80) 95   


 


3/5/18 05:22 96.4 80 18 106/67 (80) 94   


 


3/5/18 00:48 96.7 78 18 121/66 (84) 100   














I/O      


 


 3/4/18 3/4/18 3/4/18 3/5/18 3/5/18 3/5/18





 07:00 15:00 23:00 07:00 15:00 23:00


 


Intake Total 692 ml 350 ml 240 ml 743 ml  


 


Output Total 200 ml 625 ml 250 ml 200 ml  


 


Balance 492 ml -275 ml -10 ml 543 ml  


 


      


 


Intake Oral 0 ml  240 ml 120 ml  


 


IV Total 692 ml 350 ml  623 ml  


 


Output Urine Total 200 ml 475 ml 250 ml 200 ml  


 


Estimated Blood Loss  150 ml    


 


Bladder Scan Volume Amount      399 ml


 


# Bowel Movements 0  0   








Result Diagram:  


3/3/18 0616                                                                    

            3/3/18 0616





Objective Remarks


sleepy


LLE: dressing CDI. strong pulses and good cap refil





Assessment & Plan


Assessment and Plan


POD1- left hip troch IMN





sleepy


Antiocoagulation: Lovenox while hospitalized (xeralto at discharge)


Weight bearing status: 50%


Dressing: Change daily, by RN starting postop day 2


Future procedure planned: none


Dispo: expected discharge 2-3 days SNF











Chase Bailon Jr., MD Mar 5, 2018 22:15

## 2018-03-06 VITALS
TEMPERATURE: 98.2 F | SYSTOLIC BLOOD PRESSURE: 173 MMHG | DIASTOLIC BLOOD PRESSURE: 79 MMHG | RESPIRATION RATE: 18 BRPM | HEART RATE: 90 BPM | OXYGEN SATURATION: 96 %

## 2018-03-06 VITALS
HEART RATE: 74 BPM | DIASTOLIC BLOOD PRESSURE: 56 MMHG | RESPIRATION RATE: 15 BRPM | SYSTOLIC BLOOD PRESSURE: 115 MMHG | OXYGEN SATURATION: 93 % | TEMPERATURE: 98.2 F

## 2018-03-06 VITALS
SYSTOLIC BLOOD PRESSURE: 118 MMHG | TEMPERATURE: 97.2 F | OXYGEN SATURATION: 92 % | DIASTOLIC BLOOD PRESSURE: 58 MMHG | RESPIRATION RATE: 17 BRPM | HEART RATE: 72 BPM

## 2018-03-06 VITALS
RESPIRATION RATE: 17 BRPM | DIASTOLIC BLOOD PRESSURE: 56 MMHG | TEMPERATURE: 97.2 F | SYSTOLIC BLOOD PRESSURE: 122 MMHG | OXYGEN SATURATION: 93 % | HEART RATE: 80 BPM

## 2018-03-06 VITALS
DIASTOLIC BLOOD PRESSURE: 63 MMHG | TEMPERATURE: 98.2 F | SYSTOLIC BLOOD PRESSURE: 126 MMHG | HEART RATE: 76 BPM | OXYGEN SATURATION: 96 % | RESPIRATION RATE: 18 BRPM

## 2018-03-06 VITALS — OXYGEN SATURATION: 96 %

## 2018-03-06 VITALS
OXYGEN SATURATION: 93 % | HEART RATE: 73 BPM | SYSTOLIC BLOOD PRESSURE: 129 MMHG | DIASTOLIC BLOOD PRESSURE: 62 MMHG | RESPIRATION RATE: 19 BRPM | TEMPERATURE: 98.1 F

## 2018-03-06 LAB
BASOPHILS # BLD AUTO: 0 TH/MM3 (ref 0–0.2)
BASOPHILS NFR BLD: 0.6 % (ref 0–2)
BUN SERPL-MCNC: 15 MG/DL (ref 7–18)
CALCIUM SERPL-MCNC: 7.9 MG/DL (ref 8.5–10.1)
CHLORIDE SERPL-SCNC: 110 MEQ/L (ref 98–107)
CREAT SERPL-MCNC: 0.4 MG/DL (ref 0.5–1)
EOSINOPHIL # BLD: 0.2 TH/MM3 (ref 0–0.4)
EOSINOPHIL NFR BLD: 2.8 % (ref 0–4)
ERYTHROCYTE [DISTWIDTH] IN BLOOD BY AUTOMATED COUNT: 13.4 % (ref 11.6–17.2)
GFR SERPLBLD BASED ON 1.73 SQ M-ARVRAT: 152 ML/MIN (ref 89–?)
GLUCOSE SERPL-MCNC: 95 MG/DL (ref 74–106)
HCO3 BLD-SCNC: 31.2 MEQ/L (ref 21–32)
HCT VFR BLD CALC: 23.6 % (ref 35–46)
HGB BLD-MCNC: 8 GM/DL (ref 11.6–15.3)
LYMPHOCYTES # BLD AUTO: 1.3 TH/MM3 (ref 1–4.8)
LYMPHOCYTES NFR BLD AUTO: 16.6 % (ref 9–44)
MCH RBC QN AUTO: 29.6 PG (ref 27–34)
MCHC RBC AUTO-ENTMCNC: 33.8 % (ref 32–36)
MCV RBC AUTO: 87.5 FL (ref 80–100)
MONOCYTE #: 0.7 TH/MM3 (ref 0–0.9)
MONOCYTES NFR BLD: 8.5 % (ref 0–8)
NEUTROPHILS # BLD AUTO: 5.5 TH/MM3 (ref 1.8–7.7)
NEUTROPHILS NFR BLD AUTO: 71.5 % (ref 16–70)
PLATELET # BLD: 222 TH/MM3 (ref 150–450)
PMV BLD AUTO: 10.1 FL (ref 7–11)
RBC # BLD AUTO: 2.7 MIL/MM3 (ref 4–5.3)
SODIUM SERPL-SCNC: 144 MEQ/L (ref 136–145)
WBC # BLD AUTO: 7.7 TH/MM3 (ref 4–11)

## 2018-03-06 RX ADMIN — SODIUM CHLORIDE SCH MLS/HR: 900 INJECTION INTRAVENOUS at 13:00

## 2018-03-06 RX ADMIN — ENOXAPARIN SODIUM SCH MG: 30 INJECTION SUBCUTANEOUS at 01:28

## 2018-03-06 RX ADMIN — STANDARDIZED SENNA CONCENTRATE AND DOCUSATE SODIUM SCH TAB: 8.6; 5 TABLET, FILM COATED ORAL at 19:38

## 2018-03-06 RX ADMIN — Medication SCH ML: at 19:18

## 2018-03-06 RX ADMIN — HYDROCODONE BITARTRATE AND ACETAMINOPHEN PRN TAB: 5; 325 TABLET ORAL at 14:58

## 2018-03-06 RX ADMIN — SENNOSIDES PRN MG: 8.6 TABLET, FILM COATED ORAL at 19:38

## 2018-03-06 RX ADMIN — MAGNESIUM HYDROXIDE PRN ML: 400 SUSPENSION ORAL at 19:37

## 2018-03-06 RX ADMIN — STANDARDIZED SENNA CONCENTRATE AND DOCUSATE SODIUM SCH TAB: 8.6; 5 TABLET, FILM COATED ORAL at 09:01

## 2018-03-06 RX ADMIN — DIGOXIN SCH MG: 125 TABLET ORAL at 09:00

## 2018-03-06 RX ADMIN — PHENYTOIN SODIUM SCH MLS/HR: 50 INJECTION INTRAMUSCULAR; INTRAVENOUS at 16:15

## 2018-03-06 RX ADMIN — ENOXAPARIN SODIUM SCH MG: 30 INJECTION SUBCUTANEOUS at 14:57

## 2018-03-06 RX ADMIN — PHENYTOIN SODIUM SCH MLS/HR: 50 INJECTION INTRAMUSCULAR; INTRAVENOUS at 06:15

## 2018-03-06 NOTE — HHI.PR
Subjective


Remarks


appears comfortable caregiver at bedside.





Objective





Vital Signs








  Date Time  Temp Pulse Resp B/P (MAP) Pulse Ox O2 Delivery O2 Flow Rate FiO2


 


3/6/18 07:36 98.1 73 19 129/62 (84) 93   


 


3/6/18 07:23 97.2 72 17 118/58 (78) 92   


 


3/6/18 00:20 97.2 80 17 122/56 (78) 93   


 


3/5/18 20:05 98.2 82 15 130/59 (82) 91   


 


3/5/18 16:00 96.7 81 18 132/80 (97) 92   


 


3/5/18 12:00 96.5 81 18 124/60 (81) 92   














I/O      


 


 3/5/18 3/5/18 3/5/18 3/6/18 3/6/18 3/6/18





 07:00 15:00 23:00 07:00 15:00 23:00


 


Intake Total 843 ml   120 ml  


 


Output Total 200 ml   350 ml  


 


Balance 643 ml   -230 ml  


 


      


 


Intake Oral 120 ml   120 ml  


 


IV Total 723 ml     


 


Output Urine Total 200 ml   350 ml  


 


Bladder Scan Volume Amount   399 ml   


 


# Bowel Movements    0  








Result Diagram:  


3/6/18 0706                                                                    

            3/3/18 0616





Procedures


Left hip intramedullary eric fixation 3/4/18


Objective Remarks


GENERAL: This is a thin, elderly,female in bed


SKIN: No rashes, ecchymoses or lesions. Cool and dry.


HEAD: Atraumatic. Normocephalic. No temporal or scalp tenderness.


EYES: Pupils equal round and reactive. Extraocular motions intact. No scleral 

icterus. No injection or drainage. 


ENT: Nose without bleeding, purulent drainage


NECK: Trachea midline. No JVD or lymphadenopathy. 


CARDIOVASCULAR: Regular rate and rhythm without murmurs, gallops, or rubs. 


RESPIRATORY: Clear to auscultation. Breath sounds equal bilaterally. No wheezes

, rales, or rhonchi.  


GASTROINTESTINAL: Abdomen soft, non-tender, nondistended. 


MUSCULOSKELETAL: surgical dressing to Left hip


NEUROLOGICAL: Awake and responds to touch, nonverbal, mumbles. per caregiver 

this is her baseline


Medications and IVs





Current Medications








 Medications


  (Trade)  Dose


 Ordered  Sig/Sukhi


 Route  Start Time


 Stop Time Status Last Admin


 


  (Tylenol)  650 mg  Q4H  PRN


 PO  3/2/18 22:15


     


 


 


  (Zofran Inj)  4 mg  Q6H  PRN


 IVP  3/2/18 22:15


     


 


 


  (Narcan Inj)  0.4 mg  UNSCH  PRN


 IV PUSH  3/2/18 22:15


     


 


 


 Sodium Chloride  1,000 ml @ 


 100 mls/hr  Q10H


 IV  3/2/18 22:15


    3/4/18 23:44


 


 


  (Morphine Inj)  2 mg  Q3H  PRN


 IV PUSH  3/2/18 22:30


    3/3/18 14:39


 


 


  (Lanoxin)  0.125 mg  DAILY


 PO  3/3/18 09:00


    3/5/18 09:03


 


 


  (SEROquel)  50 mg  HS


 PO  3/3/18 21:00


    3/5/18 21:34


 


 


  (Reglan Inj)  5 mg  Q6H  PRN


 IV PUSH  3/2/18 23:00


     


 


 


 Cefazolin Sodium/


 Dextrose  50 ml @ 


 150 mls/hr  ON  CALL


 IV  3/3/18 17:45


 3/7/18 17:44   


 


 


 Lactated Ringer's  1,000 ml @ 


 30 mls/hr  Q24H PRN


 IV  3/3/18 19:45


 3/6/18 19:44  3/4/18 06:58


 


 


 Sodium Chloride  500 ml @ 


 30 mls/hr  [id number] PRN


 IV  3/3/18 19:45


 3/6/18 19:44   


 


 


  (Lopressor)  25 mg  ON CALL  PRN


 PO  3/3/18 19:45


 3/6/18 19:44   


 


 


  (Betadine 5%


 Antisepsis Kit)  1 applic  ON CALL  PRN


 EACH NARE  3/3/18 19:45


 3/6/18 19:44   


 


 


  (Chlorhexidine


 2% Cloth)  3 pack  ON CALL  PRN


 TOPICAL  3/3/18 19:45


 3/6/18 19:44   


 


 


  (NovoLIN R INJ)  See


 Protocol


 Table ...  ON CALL  PRN


 SQ  3/3/18 19:45


 3/6/18 19:44   


 


 


  (NS Flush)  2 ml  UNSCH  PRN


 IV FLUSH  3/4/18 14:00


     


 


 


  (NS Flush)  2 ml  BID


 IV FLUSH  3/4/18 21:00


     


 


 


  (Lovenox Inj)  30 mg  Q12H


 SQ  3/5/18 02:00


    3/6/18 01:28


 


 


  (Morphine Inj)  5 mg  Q3H  PRN


 IV PUSH  3/4/18 14:00


     


 


 


  (Norco  5-325 Mg)  1 tab  Q4H  PRN


 PO  3/4/18 14:00


    3/5/18 09:03


 


 


  (Norco  5-325 Mg)  2 tab  Q4H  PRN


 PO  3/4/18 14:00


     


 


 


  (Phenergan)  25 mg  Q4H  PRN


 PO  3/4/18 14:00


     


 


 


  (Phenergan Supp)  25 mg  Q4H  PRN


 RECTAL  3/4/18 14:00


     


 


 


  (Ambien)  5 mg  HS  PRN


 PO  3/4/18 21:00


     


 


 


  (Yeny-Colace)  1 tab  BID


 PO  3/4/18 21:00


    3/5/18 21:34


 


 


  (Milk Of


 Magnesia Liq)  30 ml  Q12H  PRN


 PO  3/4/18 14:00


    3/5/18 21:34


 


 


  (Senokot)  17.2 mg  Q12H  PRN


 PO  3/4/18 14:00


    3/5/18 21:34


 


 


  (Dulcolax Supp)  10 mg  DAILY  PRN


 RECTAL  3/4/18 14:00


     


 


 


  (Lactulose Liq)  30 ml  DAILY  PRN


 PO  3/4/18 14:00


     


 


 


 Ceftriaxone


 Sodium 1000 mg/


 Sodium Chloride  100 ml @ 


 200 mls/hr  Q24H


 IV  3/5/18 13:00


    3/5/18 12:16


 











Assessment and Plan


Problem List:  


(1) Fracture, proximal femur


ICD Codes:  S72.009A - Fracture of unspecified part of neck of unspecified femur

, initial encounter for closed fracture


Status:  Acute


Plan:  Left hip intramedullary eric fixation 3/4/18


Lovenox DVT prophylas





(2) Anemia


ICD Codes:  D64.9 - Anemia, unspecified


Status:  Acute


Plan:  HGB 8.0 this am, cont to monitor, VSS, transfuse as needed





(3) UTI (urinary tract infection)


ICD Codes:  N39.0 - Urinary tract infection, site not specified


Status:  Acute


Plan:  Culture grew Klebsiella Pneumoniae. 


Cont Rocephin IV


Afebrile





(4) Alzheimer disease


ICD Codes:  G30.9 - Alzheimer's disease, unspecified


Status:  Chronic


(5) HTN (hypertension)


ICD Codes:  I10 - Essential (primary) hypertension


Plan:  Cont home medications








Problem Qualifiers





(1) Fracture, proximal femur:  


Qualified Codes:  S72.002A - Fracture of unspecified part of neck of left femur

, initial encounter for closed fracture


(2) Anemia:  


Qualified Codes:  D64.9 - Anemia, unspecified


(3) UTI (urinary tract infection):  


Qualified Codes:  N30.00 - Acute cystitis without hematuria


(4) Alzheimer disease:  


Qualified Codes:  G30.1 - Alzheimer's disease with late onset; F02.80 - 

Dementia in other diseases classified elsewhere without behavioral disturbance








Pamela Burdick Mar 6, 2018 09:00

## 2018-03-06 NOTE — PD.ORT.PN
Subjective


Subjective Remarks


no issues





Objective


Vitals





Vital Signs








  Date Time  Temp Pulse Resp B/P (MAP) Pulse Ox O2 Delivery O2 Flow Rate FiO2


 


3/6/18 15:36 98.2 90 18 173/79 (110) 96   


 


3/6/18 11:30 98.2 76 18 126/63 (84) 96   


 


3/6/18 07:36 98.1 73 19 129/62 (84) 93   


 


3/6/18 07:23 97.2 72 17 118/58 (78) 92   


 


3/6/18 00:20 97.2 80 17 122/56 (78) 93   


 


3/5/18 20:05 98.2 82 15 130/59 (82) 91   














I/O      


 


 3/5/18 3/5/18 3/5/18 3/6/18 3/6/18 3/6/18





 06:59 14:59 22:59 06:59 14:59 22:59


 


Intake Total 843 ml   120 ml 700 ml 


 


Output Total 200 ml   350 ml 450 ml 


 


Balance 643 ml   -230 ml 250 ml 


 


      


 


Intake Oral 120 ml   120 ml 700 ml 


 


IV Total 723 ml     


 


Output Urine Total 200 ml   350 ml 450 ml 


 


Bladder Scan Volume Amount   399 ml   


 


# Bowel Movements    0  








Result Diagram:  


3/6/18 0706                                                                    

            3/6/18 0706





Objective Remarks


sleepy


LLE: dressing CDI. strong pulses and good cap refil





Assessment & Plan


Assessment and Plan


POD2-  left hip troch IMN





no issues


Antiocoagulation: Lovenox while hospitalized (xeralto at discharge)


Weight bearing status: 50%


Dressing: Change daily, by RN starting postop day 2


Future procedure planned: none


Dispo: expected discharge 2-3 days SNF


Ortho stable for dc











Chase Bailon Jr., MD Mar 6, 2018 17:05

## 2018-03-07 VITALS — DIASTOLIC BLOOD PRESSURE: 85 MMHG | SYSTOLIC BLOOD PRESSURE: 160 MMHG

## 2018-03-07 VITALS
SYSTOLIC BLOOD PRESSURE: 188 MMHG | OXYGEN SATURATION: 97 % | TEMPERATURE: 97.5 F | RESPIRATION RATE: 19 BRPM | DIASTOLIC BLOOD PRESSURE: 92 MMHG | HEART RATE: 108 BPM

## 2018-03-07 VITALS
HEART RATE: 91 BPM | TEMPERATURE: 99.1 F | DIASTOLIC BLOOD PRESSURE: 97 MMHG | OXYGEN SATURATION: 97 % | RESPIRATION RATE: 18 BRPM | SYSTOLIC BLOOD PRESSURE: 171 MMHG

## 2018-03-07 VITALS
OXYGEN SATURATION: 95 % | RESPIRATION RATE: 18 BRPM | HEART RATE: 85 BPM | DIASTOLIC BLOOD PRESSURE: 67 MMHG | TEMPERATURE: 95.7 F | SYSTOLIC BLOOD PRESSURE: 142 MMHG

## 2018-03-07 VITALS
DIASTOLIC BLOOD PRESSURE: 65 MMHG | SYSTOLIC BLOOD PRESSURE: 98 MMHG | RESPIRATION RATE: 18 BRPM | HEART RATE: 83 BPM | TEMPERATURE: 95.8 F | OXYGEN SATURATION: 97 %

## 2018-03-07 VITALS
OXYGEN SATURATION: 94 % | DIASTOLIC BLOOD PRESSURE: 53 MMHG | SYSTOLIC BLOOD PRESSURE: 109 MMHG | HEART RATE: 76 BPM | TEMPERATURE: 97.9 F | RESPIRATION RATE: 18 BRPM

## 2018-03-07 VITALS
HEART RATE: 76 BPM | TEMPERATURE: 95.7 F | SYSTOLIC BLOOD PRESSURE: 116 MMHG | DIASTOLIC BLOOD PRESSURE: 52 MMHG | RESPIRATION RATE: 18 BRPM | OXYGEN SATURATION: 96 %

## 2018-03-07 VITALS
RESPIRATION RATE: 18 BRPM | SYSTOLIC BLOOD PRESSURE: 150 MMHG | DIASTOLIC BLOOD PRESSURE: 67 MMHG | TEMPERATURE: 97.5 F | OXYGEN SATURATION: 96 % | HEART RATE: 83 BPM

## 2018-03-07 VITALS
TEMPERATURE: 98.4 F | OXYGEN SATURATION: 94 % | DIASTOLIC BLOOD PRESSURE: 55 MMHG | SYSTOLIC BLOOD PRESSURE: 107 MMHG | RESPIRATION RATE: 17 BRPM | HEART RATE: 73 BPM

## 2018-03-07 VITALS
TEMPERATURE: 97.5 F | HEART RATE: 91 BPM | DIASTOLIC BLOOD PRESSURE: 82 MMHG | SYSTOLIC BLOOD PRESSURE: 146 MMHG | OXYGEN SATURATION: 97 % | RESPIRATION RATE: 15 BRPM

## 2018-03-07 LAB
BASOPHILS # BLD AUTO: 0 TH/MM3 (ref 0–0.2)
BASOPHILS NFR BLD: 0.5 % (ref 0–2)
BUN SERPL-MCNC: 14 MG/DL (ref 7–18)
CALCIUM SERPL-MCNC: 7.9 MG/DL (ref 8.5–10.1)
CHLORIDE SERPL-SCNC: 106 MEQ/L (ref 98–107)
CREAT SERPL-MCNC: 0.38 MG/DL (ref 0.5–1)
EOSINOPHIL # BLD: 0.2 TH/MM3 (ref 0–0.4)
EOSINOPHIL NFR BLD: 2.9 % (ref 0–4)
ERYTHROCYTE [DISTWIDTH] IN BLOOD BY AUTOMATED COUNT: 13 % (ref 11.6–17.2)
GFR SERPLBLD BASED ON 1.73 SQ M-ARVRAT: 161 ML/MIN (ref 89–?)
GLUCOSE SERPL-MCNC: 101 MG/DL (ref 74–106)
HCO3 BLD-SCNC: 31.1 MEQ/L (ref 21–32)
HCT VFR BLD CALC: 22.3 % (ref 35–46)
HGB BLD-MCNC: 7.7 GM/DL (ref 11.6–15.3)
LYMPHOCYTES # BLD AUTO: 1.2 TH/MM3 (ref 1–4.8)
LYMPHOCYTES NFR BLD AUTO: 17.1 % (ref 9–44)
LYMPHOCYTES: 20 % (ref 9–44)
MCH RBC QN AUTO: 29.8 PG (ref 27–34)
MCHC RBC AUTO-ENTMCNC: 34.5 % (ref 32–36)
MCV RBC AUTO: 86.6 FL (ref 80–100)
MONOCYTE #: 0.5 TH/MM3 (ref 0–0.9)
MONOCYTES NFR BLD: 6.5 % (ref 0–8)
MONOCYTES: 5 % (ref 0–8)
MYELOCYTES NFR BLD: 3 % (ref 0–0)
NEUTROPHILS # BLD AUTO: 5.1 TH/MM3 (ref 1.8–7.7)
NEUTROPHILS NFR BLD AUTO: 73 % (ref 16–70)
NEUTS BAND # BLD MANUAL: 4.9 TH/MM3 (ref 1.8–7.7)
NEUTS SEG NFR BLD MANUAL: 67 % (ref 16–70)
PLATELET # BLD: 269 TH/MM3 (ref 150–450)
PMV BLD AUTO: 8.8 FL (ref 7–11)
RBC # BLD AUTO: 2.57 MIL/MM3 (ref 4–5.3)
SODIUM SERPL-SCNC: 142 MEQ/L (ref 136–145)
WBC # BLD AUTO: 7 TH/MM3 (ref 4–11)

## 2018-03-07 PROCEDURE — 30233N1 TRANSFUSION OF NONAUTOLOGOUS RED BLOOD CELLS INTO PERIPHERAL VEIN, PERCUTANEOUS APPROACH: ICD-10-PCS | Performed by: FAMILY MEDICINE

## 2018-03-07 RX ADMIN — ENOXAPARIN SODIUM SCH MG: 30 INJECTION SUBCUTANEOUS at 13:14

## 2018-03-07 RX ADMIN — SODIUM CHLORIDE SCH MLS/HR: 900 INJECTION INTRAVENOUS at 13:11

## 2018-03-07 RX ADMIN — Medication SCH ML: at 09:00

## 2018-03-07 RX ADMIN — PHENYTOIN SODIUM SCH MLS/HR: 50 INJECTION INTRAMUSCULAR; INTRAVENOUS at 22:15

## 2018-03-07 RX ADMIN — ENOXAPARIN SODIUM SCH MG: 30 INJECTION SUBCUTANEOUS at 01:07

## 2018-03-07 RX ADMIN — HYDROCODONE BITARTRATE AND ACETAMINOPHEN PRN TAB: 5; 325 TABLET ORAL at 17:36

## 2018-03-07 RX ADMIN — Medication SCH ML: at 20:31

## 2018-03-07 RX ADMIN — STANDARDIZED SENNA CONCENTRATE AND DOCUSATE SODIUM SCH TAB: 8.6; 5 TABLET, FILM COATED ORAL at 09:38

## 2018-03-07 RX ADMIN — PHENYTOIN SODIUM SCH MLS/HR: 50 INJECTION INTRAMUSCULAR; INTRAVENOUS at 11:32

## 2018-03-07 RX ADMIN — DIGOXIN SCH MG: 125 TABLET ORAL at 09:38

## 2018-03-07 RX ADMIN — STANDARDIZED SENNA CONCENTRATE AND DOCUSATE SODIUM SCH TAB: 8.6; 5 TABLET, FILM COATED ORAL at 20:31

## 2018-03-07 RX ADMIN — PHENYTOIN SODIUM SCH MLS/HR: 50 INJECTION INTRAMUSCULAR; INTRAVENOUS at 01:07

## 2018-03-07 NOTE — HHI.PR
Subjective


Remarks


Resting in bed HGB 7.7 this am B/P on low side





Objective





Vital Signs








  Date Time  Temp Pulse Resp B/P (MAP) Pulse Ox O2 Delivery O2 Flow Rate FiO2


 


3/7/18 03:45 97.9 76 18 109/53 (71) 94   


 


3/7/18 00:45 98.4 73 17 107/55 (72) 94   


 


3/6/18 20:00 98.2 74 15 115/56 (75) 93   


 


3/6/18 17:10     96   21


 


3/6/18 15:36 98.2 90 18 173/79 (110) 96   


 


3/6/18 11:30 98.2 76 18 126/63 (84) 96   


 


3/6/18 07:36 98.1 73 19 129/62 (84) 93   


 


3/6/18 07:23 97.2 72 17 118/58 (78) 92   














I/O      


 


 3/6/18 3/6/18 3/6/18 3/7/18 3/7/18 3/7/18





 07:00 15:00 23:00 07:00 15:00 23:00


 


Intake Total 120 ml 700 ml    


 


Output Total 350 ml 450 ml 250 ml   


 


Balance -230 ml 250 ml -250 ml   


 


      


 


Intake Oral 120 ml 700 ml    


 


Output Urine Total 350 ml 450 ml 250 ml   


 


# Bowel Movements 0     








Result Diagram:  


3/6/18 0706                                                                    

            3/6/18 0706





Procedures


Left hip intramedullary eric fixation 3/4/18


Objective Remarks


GENERAL: This is a thin, elderly,female in bed


SKIN: No rashes, ecchymoses or lesions. Cool and dry.


HEAD: Atraumatic. Normocephalic. No temporal or scalp tenderness.


EYES: Pupils equal round and reactive. Extraocular motions intact. No scleral 

icterus. No injection or drainage. 


ENT: Nose without bleeding, purulent drainage


NECK: Trachea midline. No JVD or lymphadenopathy. 


CARDIOVASCULAR: Regular rate and rhythm without murmurs, gallops, or rubs. 


RESPIRATORY: Clear to auscultation. Breath sounds equal bilaterally. No wheezes

, rales, or rhonchi.  


GASTROINTESTINAL: Abdomen soft, non-tender, nondistended. 


MUSCULOSKELETAL: surgical dressing to Left hip


NEUROLOGICAL: Awake alert, not to conversive


Medications and IVs





Current Medications








 Medications


  (Trade)  Dose


 Ordered  Sig/Sukhi


 Route  Start Time


 Stop Time Status Last Admin


 


  (Tylenol)  650 mg  Q4H  PRN


 PO  3/2/18 22:15


     


 


 


  (Zofran Inj)  4 mg  Q6H  PRN


 IVP  3/2/18 22:15


     


 


 


  (Narcan Inj)  0.4 mg  UNSCH  PRN


 IV PUSH  3/2/18 22:15


     


 


 


 Sodium Chloride  1,000 ml @ 


 100 mls/hr  Q10H


 IV  3/2/18 22:15


    3/4/18 23:44


 


 


  (Morphine Inj)  2 mg  Q3H  PRN


 IV PUSH  3/2/18 22:30


    3/3/18 14:39


 


 


  (Lanoxin)  0.125 mg  DAILY


 PO  3/3/18 09:00


    3/6/18 09:00


 


 


  (SEROquel)  50 mg  HS


 PO  3/3/18 21:00


    3/6/18 19:38


 


 


  (Reglan Inj)  5 mg  Q6H  PRN


 IV PUSH  3/2/18 23:00


     


 


 


 Cefazolin Sodium/


 Dextrose  50 ml @ 


 150 mls/hr  ON  CALL


 IV  3/3/18 17:45


 3/7/18 17:44   


 


 


  (NS Flush)  2 ml  UNSCH  PRN


 IV FLUSH  3/4/18 14:00


     


 


 


  (NS Flush)  2 ml  BID


 IV FLUSH  3/4/18 21:00


     


 


 


  (Lovenox Inj)  30 mg  Q12H


 SQ  3/5/18 02:00


    3/7/18 01:07


 


 


  (Morphine Inj)  5 mg  Q3H  PRN


 IV PUSH  3/4/18 14:00


     


 


 


  (Norco  5-325 Mg)  1 tab  Q4H  PRN


 PO  3/4/18 14:00


    3/6/18 14:58


 


 


  (Norco  5-325 Mg)  2 tab  Q4H  PRN


 PO  3/4/18 14:00


     


 


 


  (Phenergan)  25 mg  Q4H  PRN


 PO  3/4/18 14:00


     


 


 


  (Phenergan Supp)  25 mg  Q4H  PRN


 RECTAL  3/4/18 14:00


     


 


 


  (Ambien)  5 mg  HS  PRN


 PO  3/4/18 21:00


     


 


 


  (Yeny-Colace)  1 tab  BID


 PO  3/4/18 21:00


    3/6/18 19:38


 


 


  (Milk Of


 Magnesia Liq)  30 ml  Q12H  PRN


 PO  3/4/18 14:00


    3/6/18 19:37


 


 


  (Senokot)  17.2 mg  Q12H  PRN


 PO  3/4/18 14:00


    3/6/18 19:38


 


 


  (Dulcolax Supp)  10 mg  DAILY  PRN


 RECTAL  3/4/18 14:00


     


 


 


  (Lactulose Liq)  30 ml  DAILY  PRN


 PO  3/4/18 14:00


     


 


 


 Ceftriaxone


 Sodium 1000 mg/


 Sodium Chloride  100 ml @ 


 200 mls/hr  Q24H


 IV  3/5/18 13:00


    3/5/18 12:16


 











Assessment and Plan


Problem List:  


(1) Fracture, proximal femur


ICD Codes:  S72.009A - Fracture of unspecified part of neck of unspecified femur

, initial encounter for closed fracture


Status:  Acute


Plan:  Left hip intramedullary eric fixation 3/4/18


Lovenox DVT prophylas





(2) Anemia


ICD Codes:  D64.9 - Anemia, unspecified


Status:  Acute


Plan:  HGB 7.7BP on low side, will transfuse prior to Rehab r/t weakness





(3) UTI (urinary tract infection)


ICD Codes:  N39.0 - Urinary tract infection, site not specified


Status:  Acute


Plan:  Culture grew Klebsiella Pneumoniae. 


Cont Rocephin IV


Afebrile





(4) Alzheimer disease


ICD Codes:  G30.9 - Alzheimer's disease, unspecified


Status:  Chronic


(5) HTN (hypertension)


ICD Codes:  I10 - Essential (primary) hypertension


Plan:  Cont home medications, running on low side, cont to monitor








Problem Qualifiers





(1) Fracture, proximal femur:  


Qualified Codes:  S72.002A - Fracture of unspecified part of neck of left femur

, initial encounter for closed fracture


(2) Anemia:  


Qualified Codes:  D64.9 - Anemia, unspecified


(3) UTI (urinary tract infection):  


Qualified Codes:  N30.00 - Acute cystitis without hematuria


(4) Alzheimer disease:  


Qualified Codes:  G30.1 - Alzheimer's disease with late onset; F02.80 - 

Dementia in other diseases classified elsewhere without behavioral disturbance








Pamela Burdick Mar 7, 2018 06:55

## 2018-03-08 VITALS
SYSTOLIC BLOOD PRESSURE: 131 MMHG | TEMPERATURE: 97.5 F | HEART RATE: 80 BPM | RESPIRATION RATE: 15 BRPM | DIASTOLIC BLOOD PRESSURE: 71 MMHG | OXYGEN SATURATION: 98 %

## 2018-03-08 VITALS
DIASTOLIC BLOOD PRESSURE: 63 MMHG | SYSTOLIC BLOOD PRESSURE: 126 MMHG | RESPIRATION RATE: 18 BRPM | TEMPERATURE: 97.1 F | HEART RATE: 73 BPM | OXYGEN SATURATION: 96 %

## 2018-03-08 VITALS
DIASTOLIC BLOOD PRESSURE: 80 MMHG | OXYGEN SATURATION: 97 % | SYSTOLIC BLOOD PRESSURE: 151 MMHG | HEART RATE: 79 BPM | RESPIRATION RATE: 15 BRPM | TEMPERATURE: 97 F

## 2018-03-08 VITALS
SYSTOLIC BLOOD PRESSURE: 145 MMHG | DIASTOLIC BLOOD PRESSURE: 77 MMHG | RESPIRATION RATE: 18 BRPM | OXYGEN SATURATION: 94 % | HEART RATE: 80 BPM | TEMPERATURE: 96.6 F

## 2018-03-08 VITALS — OXYGEN SATURATION: 97 %

## 2018-03-08 LAB
BASOPHILS # BLD AUTO: 0.1 TH/MM3 (ref 0–0.2)
BASOPHILS NFR BLD: 0.6 % (ref 0–2)
BUN SERPL-MCNC: 12 MG/DL (ref 7–18)
CALCIUM SERPL-MCNC: 8 MG/DL (ref 8.5–10.1)
CHLORIDE SERPL-SCNC: 105 MEQ/L (ref 98–107)
CREAT SERPL-MCNC: 0.38 MG/DL (ref 0.5–1)
EOSINOPHIL # BLD: 0.3 TH/MM3 (ref 0–0.4)
EOSINOPHIL NFR BLD: 3.8 % (ref 0–4)
ERYTHROCYTE [DISTWIDTH] IN BLOOD BY AUTOMATED COUNT: 13.1 % (ref 11.6–17.2)
GFR SERPLBLD BASED ON 1.73 SQ M-ARVRAT: 161 ML/MIN (ref 89–?)
GLUCOSE SERPL-MCNC: 90 MG/DL (ref 74–106)
HCO3 BLD-SCNC: 30.4 MEQ/L (ref 21–32)
HCT VFR BLD CALC: 29.1 % (ref 35–46)
HGB BLD-MCNC: 10.2 GM/DL (ref 11.6–15.3)
LYMPHOCYTES # BLD AUTO: 1.5 TH/MM3 (ref 1–4.8)
LYMPHOCYTES NFR BLD AUTO: 17.3 % (ref 9–44)
LYMPHOCYTES: 18 % (ref 9–44)
MCH RBC QN AUTO: 30.4 PG (ref 27–34)
MCHC RBC AUTO-ENTMCNC: 35 % (ref 32–36)
MCV RBC AUTO: 87 FL (ref 80–100)
MONOCYTE #: 0.7 TH/MM3 (ref 0–0.9)
MONOCYTES NFR BLD: 8.2 % (ref 0–8)
MONOCYTES: 11 % (ref 0–8)
MYELOCYTES NFR BLD: 3 % (ref 0–0)
NEUTROPHILS # BLD AUTO: 6.3 TH/MM3 (ref 1.8–7.7)
NEUTROPHILS NFR BLD AUTO: 70.1 % (ref 16–70)
NEUTS BAND # BLD MANUAL: 6.3 TH/MM3 (ref 1.8–7.7)
NEUTS SEG NFR BLD MANUAL: 68 % (ref 16–70)
PLATELET # BLD: 290 TH/MM3 (ref 150–450)
PMV BLD AUTO: 8.5 FL (ref 7–11)
RBC # BLD AUTO: 3.35 MIL/MM3 (ref 4–5.3)
SODIUM SERPL-SCNC: 141 MEQ/L (ref 136–145)
WBC # BLD AUTO: 8.9 TH/MM3 (ref 4–11)

## 2018-03-08 RX ADMIN — PHENYTOIN SODIUM SCH MLS/HR: 50 INJECTION INTRAMUSCULAR; INTRAVENOUS at 09:04

## 2018-03-08 RX ADMIN — DIGOXIN SCH MG: 125 TABLET ORAL at 09:02

## 2018-03-08 RX ADMIN — STANDARDIZED SENNA CONCENTRATE AND DOCUSATE SODIUM SCH TAB: 8.6; 5 TABLET, FILM COATED ORAL at 09:01

## 2018-03-08 RX ADMIN — ENOXAPARIN SODIUM SCH MG: 30 INJECTION SUBCUTANEOUS at 01:54

## 2018-03-08 RX ADMIN — HYDROCODONE BITARTRATE AND ACETAMINOPHEN PRN TAB: 5; 325 TABLET ORAL at 13:36

## 2018-03-08 RX ADMIN — HYDROCODONE BITARTRATE AND ACETAMINOPHEN PRN TAB: 5; 325 TABLET ORAL at 09:02

## 2018-03-08 RX ADMIN — Medication SCH ML: at 09:02

## 2018-03-08 RX ADMIN — SODIUM CHLORIDE SCH MLS/HR: 900 INJECTION INTRAVENOUS at 13:36

## 2018-03-08 RX ADMIN — ENOXAPARIN SODIUM SCH MG: 30 INJECTION SUBCUTANEOUS at 13:36

## 2018-03-08 NOTE — HHI.DS
Discharge Summary


Admission Date


Mar 2, 2018 at 22:13


Admitting Diagnosis





Left Femur fracture.





Procedures


Left hip intramedullary eric fixation 3/4/18


CBC/BMP:  


3/8/18 0655                                                                    

            3/8/18 0655





Significant Findings





Laboratory Tests








Test


  3/6/18


07:06 3/7/18


06:27 3/8/18


06:55


 


Red Blood Count


  2.70 MIL/MM3


(4.00-5.30) 2.57 MIL/MM3


(4.00-5.30) 3.35 MIL/MM3


(4.00-5.30)


 


Hemoglobin


  8.0 GM/DL


(11.6-15.3) 7.7 GM/DL


(11.6-15.3) 10.2 GM/DL


(11.6-15.3)


 


Hematocrit


  23.6 %


(35.0-46.0) 22.3 %


(35.0-46.0) 29.1 %


(35.0-46.0)


 


Neutrophils (%) (Auto)


  71.5 %


(16.0-70.0) 73.0 %


(16.0-70.0) 70.1 %


(16.0-70.0)


 


Monocytes (%) (Auto)


  8.5 %


(0.0-8.0) 


  8.2 %


(0.0-8.0)


 


Creatinine


  0.40 MG/DL


(0.50-1.00) 0.38 MG/DL


(0.50-1.00) 0.38 MG/DL


(0.50-1.00)


 


Calcium Level


  7.9 MG/DL


(8.5-10.1) 7.9 MG/DL


(8.5-10.1) 8.0 MG/DL


(8.5-10.1)


 


Chloride Level


  110 MEQ/L


() 


  


 


 


Anion Gap 3 MEQ/L (5-15)   


 


Eosinophils %  5 % (0-4)  


 


Myelocytes  3 % (0-0)  








PE at Discharge


GENERAL: This is a thin, elderly,female in bed


SKIN: No rashes, ecchymoses or lesions. Cool and dry.


HEAD: Atraumatic. Normocephalic. No temporal or scalp tenderness.


EYES: Pupils equal round and reactive. Extraocular motions intact. No scleral 

icterus. No injection or drainage. 


ENT: Nose without bleeding, purulent drainage


NECK: Trachea midline. No JVD or lymphadenopathy. 


CARDIOVASCULAR: Regular rate and rhythm without murmurs, gallops, or rubs. 


RESPIRATORY: Clear to auscultation. Breath sounds equal bilaterally. No wheezes

, rales, or rhonchi.  


GASTROINTESTINAL: Abdomen soft, non-tender, nondistended. 


MUSCULOSKELETAL: surgical dressing to Left hip


NEUROLOGICAL: Awake alert, not to conversive


Hospital Course


Patient was noted by family to be limping and was brought to [facility] and 

found to have a left intertrochanteric hip fracture and was transferred here 

for surgical intervention. Had surgery on 3/4/18 with out incident. Recived 2 

units of blood on 3/7/18 for HGB 7.7. 


HGB 3/8/ 10.2m transfer to rehab for continued strengthening.


Pt Condition on Discharge:  Stable


Discharge Disposition:  Discharge to SNF


Discharge Instructions


DIET: Follow Instructions for:  As Tolerated, No Restrictions


Activities you can perform:  Weight Bearing as [place]


Follow up Referrals:  


Orthopedics - 2 Weeks @ [facility] with Chase Bailon Jr., MD





New Medications:  


Oxycodone-Acetaminophen (Percocet) 5-325 mg Tab


1 TAB PO Q4H PRN for PAIN, #60 TAB 0 Refills





Rivaroxaban (Xarelto) 10 Mg Tab


10 MG PO DAILY for Blood Clot Prevention, #30 TAB 0 Refills





Amlodipine (Norvasc) 5 Mg Tab


2.5 MG PO BID for Blood Pressure Management for 30 Days, #30 TAB





 


Continued Medications:  


Digoxin (Digoxin) 0.125 Mg Tab


0.125 MG PO DAILY for Regulate Heart Beat, #30 TAB 0 Refills





Quetiapine (Quetiapine) 50 Mg Tab


50 MG PO HS, #30 TAB 0 Refills

















Pamela Burdick Mar 8, 2018 08:45

## 2019-08-28 NOTE — HHI.HP
History of Present Illness


Service


Family medicine


Primary Care Physician


Tank Santacruz, DO


Admission Diagnosis


femur fracture, dehydration


Diagnoses:  


(1) Fracture, proximal femur


Diagnosis:  Principal





(2) UTI (urinary tract infection)


Diagnosis:  Secondary





(3) Alzheimer disease


Diagnosis:  Secondary





History of Present Illness


84 year old female who presents to the emergency department for pain in her 

right leg.  History obtained from records.  Patient was found to have a 

proximal femur fracture. Ortho was consulted and reduction and intramedullary 

nail fixation right femur intertrochanteric and subtrochanteric performed this 

morning.   No fall was noted per  unless it was unwitnessed.  Patient 

normally ambulates independently at home.  She has a past medical history of 

advanced alzheimer disease/ dementia, SVT, MVR, HLD, HTN, and anxiety.  Urine 

sample was found to be abnormal and patient was started on rocephin pending 

cultures.





Review of Systems


ROS Limitations:  Altered Mental Status





Past Family Social History


Allergies:  


Coded Allergies:  


     Epinephrine (Verified  Adverse Reaction, Severe, Palpitations , 6/29/17)


     Lidocaine (Verified  Adverse Reaction, Severe, Palpitations , 6/29/17)


Past Medical History


Alzheimer/dementia


SVT


MVR


HTN


Anxiety


Past Surgical History


appendectomy


Active Ordered Medications





 Current Medications








 Medications


  (Trade)  Dose


 Ordered  Sig/Sukhi


 Route  Start Time


 Stop Time Status Last Admin


 


  (NS 1000 ml Inj)  1,000 ml @ 


 83 mls/hr  Q12H3M


 IV  6/29/17 16:12


    6/30/17 12:48


 


 


  (Zofran Inj)  4 mg  Q6H  PRN


 IVP  6/29/17 16:15


     


 


 


  (Tylenol)  650 mg  Q4H  PRN


 PO  6/29/17 16:15


     


 


 


  (Colace)  100 mg  BID


 PO  6/29/17 21:00


     


 


 


 Naloxone HCl 0.4


 mg  0.4 mg  UNSCH  PRN


 IV  6/29/17 16:15


     


 


 


 Ceftriaxone


 Sodium 1000 mg/


 Sodium Chloride  100 ml @ 


 200 mls/hr  Q24H


 IV  6/29/17 18:00


    6/29/17 18:05


 


 


 Lactated Ringer's  1,000 ml @ 


 30 mls/hr  Q24H PRN


 IV  6/29/17 22:00


 7/2/17 21:59   


 


 


  ( ml Inj)  500 ml @ 


 30 mls/hr  [id number] PRN


 IV  6/29/17 22:00


 7/2/17 21:59   


 


 


  (Norco  5-325 Mg)  1 tab  Q4H  PRN


 PO  6/30/17 00:15


    6/30/17 01:33


 


 


  (NS Flush)  2 ml  UNSCH  PRN


 IVF  6/30/17 09:45


     


 


 


  (NS Flush)  2 ml  BID


 IVF  6/30/17 21:00


     


 


 


  (Lovenox Inj)  30 mg  Q24H


 SQ  7/1/17 09:00


     


 


 


  (Oscal-D 250-125)  250 mg  TID


 PO  6/30/17 13:00


     


 


 


  (Morphine Inj)  3 mg  Q3H  PRN


 IV PUSH  6/30/17 09:45


     


 


 


  (Vitamin D3)  5,000 units  DAILY


 PO  7/1/17 09:00


     


 


 


 Miscellaneous


 Information  ALL


 NURSING


 DEPARTME...  UNSCH  PRN


 .XX  6/30/17 09:46


 7/1/17 09:45   


 








Family History


Unable to obtain


Social History


No alcohol or tobacco use


Lives with 





Physical Exam


Vital Signs





 Vital Signs








  Date Time  Temp Pulse Resp B/P Pulse Ox O2 Delivery O2 Flow Rate FiO2


 


6/30/17 12:00 97.2 62 16 128/69 100   


 


6/30/17 09:53 97.6 91 13 164/63 100 Simple Mask 7 


 


6/30/17 04:18 96.7 72 18 115/57 95   


 


6/30/17 01:03  87      


 


6/30/17 00:42 97.6 82 18 105/60 93   


 


6/29/17 22:17  81      


 


6/29/17 21:10 98.0 88 18 139/77 96   


 


6/29/17 18:00  97 18 133/66 100 Room Air  


 


6/29/17 15:45  91 18 117/61 98 Room Air  


 


6/29/17 14:20 97.6 96 16 138/77 98   








Physical Exam


GENERAL: Frail elderly female


SKIN: Dry skin


HEAD: Atraumatic. Normocephalic. 


EYES: Pupils equal and round.  No injection or drainage. 


ENT:  Moist mucous membranes


NECK: Trachea midline. 


CARDIOVASCULAR: Regular rate and rhythm.  No murmur appreciated.  Both feet are 

warm with normal capillary refill.  We were able to Doppler DP.  Pulses in both 

feet


RESPIRATORY: Clear to auscultation. Breath sounds equal bilaterally. 


GASTROINTESTINAL: Abdomen soft, non-tender, nondistended. 


MUSCULOSKELETAL: Swelling of the right proximal lower extremity with soft 

compartments.


NEUROLOGICAL: Mumbling words.


Laboratory





Laboratory Tests








Test 6/29/17 6/29/17 6/29/17 6/29/17





 14:15 14:35 15:55 16:10


 


Blood Type B POSITIVE    


 


Antibody Screen NEGATIVE    


 


Blood Bank Comment     


 


White Blood Count  15.7   


 


Red Blood Count  3.89   


 


Hemoglobin  11.2   


 


Hematocrit  34.0   


 


Mean Corpuscular Volume  87.5   


 


Mean Corpuscular Hemoglobin  28.7   


 


Mean Corpuscular Hemoglobin  32.8   





Concent    


 


Red Cell Distribution Width  13.6   


 


Platelet Count  296   


 


Mean Platelet Volume  10.4   


 


Neutrophils (%) (Auto)  87.9   


 


Lymphocytes (%) (Auto)  4.3   


 


Monocytes (%) (Auto)  7.6   


 


Eosinophils (%) (Auto)  0.1   


 


Basophils (%) (Auto)  0.1   


 


Neutrophils # (Auto)  13.8   


 


Lymphocytes # (Auto)  0.7   


 


Monocytes # (Auto)  1.2   


 


Eosinophils # (Auto)  0.0   


 


Basophils # (Auto)  0.0   


 


CBC Comment  DIFF FINAL   


 


Differential Comment     


 


Sodium Level  142   


 


Potassium Level  4.3   


 


Chloride Level  104   


 


Carbon Dioxide Level  23.3   


 


Anion Gap  15   


 


Blood Urea Nitrogen  36   


 


Creatinine  1.40   


 


Estimat Glomerular Filtration  36   





Rate    


 


Random Glucose  222   


 


Calcium Level  9.0   


 


Total Bilirubin  0.7   


 


Aspartate Amino Transf  38   





(AST/SGOT)    


 


Alanine Aminotransferase  17   





(ALT/SGPT)    


 


Alkaline Phosphatase  105   


 


Total Creatine Kinase  1373   


 


Creatine Kinase MB  6.1   


 


Creatine Kinase MB %  0.4   


 


Total Protein  7.0   


 


Albumin  3.2   


 


Prothrombin Time   10.7  


 


Prothromb Time International   1.0  





Ratio    


 


Activated Partial   23.8  





Thromboplast Time    


 


Urine Collection Type    CATH 


 


Urine Color    YELLOW 


 


Urine Turbidity    SLIGHT 


 


Urine pH    5.5 


 


Urine Specific Gravity    1.020 


 


Urine Protein    TRACE 


 


Urine Glucose (UA)    NEG 


 


Urine Ketones    TRACE 


 


Urine Occult Blood    SMALL 


 


Urine Nitrite    NEG 


 


Urine Bilirubin    NEG 


 


Urine Leukocyte Esterase    TRACE 


 


Urine RBC    4-9 


 


Urine WBC    6-8 


 


Urine Squamous Epithelial    6-8 





Cells    


 


Urine Bacteria    MANY 


 


Microscopic Urinalysis Comment    CATH-CULTURE





    IND


 


Urine Collection Time    16:10 














 Date/Time Procedure Status





Source Growth 


 


 6/29/17 16:10 Urine Culture Received





Urine Catheterized Urine Pending 








Result Diagram:  


6/29/17 1435                                                                   

             6/29/17 1435





Imaging





Last 72 hours Impressions








Hip and Pelvis X-Ray 6/29/17 0000 Signed





Impressions: 





 Service Date/Time:  Thursday, June 29, 2017 15:09 - CONCLUSION:  Comminuted 4 





 fragment intertrochanteric fracture of the right hip  Intact left hip and AP 





 pelvis.     Fletcher Medina MD 


 


Head CT 6/29/17 0000 Signed





Impressions: 





 Service Date/Time:  Thursday, June 29, 2017 15:23 - CONCLUSION:  1. Severe 





 diffuse cerebral atrophy.  2. Moderate periventricular and subcortical white 





 matter small vessels changes bilaterally.  3. No acute infarct, acute 





 hemorrhage, mass effect or extra-axial fluid collections.    Tomas Bang MD 


 


Femur X-Ray 6/29/17 0000 Signed





Impressions: 





 Service Date/Time:  Thursday, June 29, 2017 15:04 - CONCLUSION:  Proximal 

right 





 femur fracture     Anatoliy Reyna MD 


 


Chest X-Ray 6/29/17 0000 Signed





Impressions: 





 Service Date/Time:  Thursday, June 29, 2017 15:23 - CONCLUSION:   1.  No acute 





 cardiopulmonary disease.   2.  Degenerative changes and scoliosis of the 





 thoracolumbar spine.     Tomas Bang MD 


 


Cervical Spine CT 6/29/17 0000 Signed





Impressions: 





 Service Date/Time:  Thursday, June 29, 2017 15:23 - CONCLUSION:  1.  No acute 





 fracture or prevertebral soft tissue swelling. 2.  Moderate spinal stenosis 

and 





 bilateral foraminal narrowing at C4-5.  3.  Moderate bilateral foraminal 





 narrowing but no spinal stenosis at C3-4, C5-6 and C6-7.  4.  Grade I 





 retrolisthesis of C4 in relation to C3 and C5.  5.  Grade I anterolisthesis of 





 C7 in relation to T1.  6.  Diffuse cervical spondylosis and scoliosis of the 





 cervical spine.    Tomas Bang MD 











Assessment and Plan


Problem List:  


(1) Fracture, proximal femur


Status:  Acute


Plan:  Ortho managing reduction and intramedullary nail fixation right femur 

intertrochanteric and subtrochanteric performed.  Lovenox for anticoagulation.  

OOB in chair when rounding


CPK elevated at 1373 will recheck in AM 





(2) Alzheimer disease


Status:  Acute


Plan:  Patient with advanced dementia.  Patient currently with sitter. 





(3) UTI (urinary tract infection)


Status:  Acute


Plan:  Rocephin started and culture is pending.  Esteves present plan to remove 

in AM





(4) Dehydration


Status:  Acute


Plan:  Elevated creat at 1.40 and BUN 36.  Will continue IVF





Assessment and Plan


Assessment and plan discussed with Dr. Santacruz


Discussed Condition With


Nursing


Discharge Planning


SNF or Acute rehab


Physician Attestation


I and the ARNP have both examined this patient and reviewed this note and I 

agree with these findings and plan of care.  Tank Santacruz DO





Problem Qualifiers





(1) Fracture, proximal femur:  


Qualified Code:  S72.001A - Fracture, proximal femur, right, closed, initial 

encounter





Gellermann,[facility] Jun 30, 2017 13:20 None